# Patient Record
Sex: FEMALE | Race: WHITE
[De-identification: names, ages, dates, MRNs, and addresses within clinical notes are randomized per-mention and may not be internally consistent; named-entity substitution may affect disease eponyms.]

---

## 2018-07-09 ENCOUNTER — HOSPITAL ENCOUNTER (INPATIENT)
Dept: HOSPITAL 74 - JER | Age: 53
LOS: 10 days | Discharge: HOME | DRG: 669 | End: 2018-07-19
Attending: INTERNAL MEDICINE | Admitting: INTERNAL MEDICINE
Payer: COMMERCIAL

## 2018-07-09 VITALS — BODY MASS INDEX: 37.5 KG/M2

## 2018-07-09 DIAGNOSIS — N35.9: ICD-10-CM

## 2018-07-09 DIAGNOSIS — M31.0: ICD-10-CM

## 2018-07-09 DIAGNOSIS — N13.30: ICD-10-CM

## 2018-07-09 DIAGNOSIS — E66.9: ICD-10-CM

## 2018-07-09 DIAGNOSIS — R60.0: ICD-10-CM

## 2018-07-09 DIAGNOSIS — L03.116: ICD-10-CM

## 2018-07-09 DIAGNOSIS — N13.9: ICD-10-CM

## 2018-07-09 DIAGNOSIS — F41.9: ICD-10-CM

## 2018-07-09 DIAGNOSIS — I10: ICD-10-CM

## 2018-07-09 DIAGNOSIS — R31.0: ICD-10-CM

## 2018-07-09 DIAGNOSIS — R33.9: ICD-10-CM

## 2018-07-09 DIAGNOSIS — E11.9: ICD-10-CM

## 2018-07-09 DIAGNOSIS — N30.91: ICD-10-CM

## 2018-07-09 DIAGNOSIS — N17.9: Primary | ICD-10-CM

## 2018-07-09 DIAGNOSIS — E87.5: ICD-10-CM

## 2018-07-09 DIAGNOSIS — R21: ICD-10-CM

## 2018-07-09 DIAGNOSIS — K59.00: ICD-10-CM

## 2018-07-09 DIAGNOSIS — R00.0: ICD-10-CM

## 2018-07-09 LAB
ALBUMIN SERPL-MCNC: 3.8 G/DL (ref 3.4–5)
ALP SERPL-CCNC: 196 U/L (ref 45–117)
ALT SERPL-CCNC: 19 U/L (ref 12–78)
ANION GAP SERPL CALC-SCNC: 16 MMOL/L (ref 8–16)
AST SERPL-CCNC: 12 U/L (ref 15–37)
BILIRUB SERPL-MCNC: 0.3 MG/DL (ref 0.2–1)
BUN SERPL-MCNC: 81 MG/DL (ref 7–18)
CALCIUM SERPL-MCNC: 9.8 MG/DL (ref 8.5–10.1)
CHLORIDE SERPL-SCNC: 101 MMOL/L (ref 98–107)
CO2 SERPL-SCNC: 19 MMOL/L (ref 21–32)
CREAT SERPL-MCNC: 6.2 MG/DL (ref 0.55–1.02)
DEPRECATED RDW RBC AUTO: 12.7 % (ref 11.6–15.6)
GLUCOSE SERPL-MCNC: 269 MG/DL (ref 74–106)
HCT VFR BLD CALC: 37.3 % (ref 32.4–45.2)
HGB BLD-MCNC: 12.5 GM/DL (ref 10.7–15.3)
INR BLD: 1.19 (ref 0.82–1.09)
MCH RBC QN AUTO: 28.9 PG (ref 25.7–33.7)
MCHC RBC AUTO-ENTMCNC: 33.4 G/DL (ref 32–36)
MCV RBC: 86.3 FL (ref 80–96)
PLATELET # BLD AUTO: 386 K/MM3 (ref 134–434)
PMV BLD: 7.9 FL (ref 7.5–11.1)
POTASSIUM SERPLBLD-SCNC: 5.6 MMOL/L (ref 3.5–5.1)
PROT SERPL-MCNC: 8.7 G/DL (ref 6.4–8.2)
PT PNL PPP: 13.5 SEC (ref 9.7–13)
RBC # BLD AUTO: 4.32 M/MM3 (ref 3.6–5.2)
SODIUM SERPL-SCNC: 136 MMOL/L (ref 136–145)
WBC # BLD AUTO: 10.2 K/MM3 (ref 4–10)

## 2018-07-09 NOTE — PDOC
*Physical Exam





- Vital Signs


 Last Vital Signs











Temp Pulse Resp BP Pulse Ox


 


 98.8 F   128 H  16   161/107   98 


 


 07/09/18 15:16  07/09/18 15:16  07/09/18 15:16  07/09/18 15:16  07/09/18 15:16














- Physical Exam


Extremity: positive: Swelling (left lower swelling), Erythema (left medial thigh

)





ED Treatment Course





- LABORATORY


CBC & Chemistry Diagram: 


 07/09/18 16:58





 07/09/18 16:58





- ADDITIONAL ORDERS


Additional order review: 


 Laboratory  Results











  07/09/18 07/09/18 07/09/18





  16:58 16:58 16:58


 


PT with INR    13.50 H


 


INR    1.19 H


 


Sodium   136 


 


Potassium   5.6 H 


 


Chloride   101 


 


Carbon Dioxide   19 L 


 


Anion Gap   16 


 


BUN   81 H 


 


Creatinine   6.2 H 


 


Creat Clearance w eGFR   7.01 


 


Random Glucose   269 H 


 


Calcium   9.8 


 


Total Bilirubin   0.3 


 


AST   12 L 


 


ALT   19 


 


Alkaline Phosphatase   196 H 


 


B-Natriuretic Peptide  786.38 H  


 


Total Protein   8.7 H 


 


Albumin   3.8 








 











  07/09/18





  16:58


 


RBC  4.32


 


MCV  86.3


 


MCHC  33.4


 


RDW  12.7


 


MPV  7.9














- Medications


Given in the ED: 


ED Medications














Discontinued Medications














Generic Name Dose Route Start Last Admin





  Trade Name Freq  PRN Reason Stop Dose Admin


 


Sodium Chloride  500 mls @ 500 mls/hr  07/09/18 17:15  07/09/18 17:41





  Normal Saline -  IV  07/09/18 18:14  500 mls/hr





  ASDIR STA   Administration





     





     





     





     


 


Sodium Chloride  500 mls @ 500 mls/hr  07/09/18 17:49  07/09/18 17:58





  Normal Saline -  IV  07/09/18 18:48  500 mls/hr





  ASDIR STA   Administration





     





     





     





     


 


Ondansetron HCl  4 mg  07/09/18 17:32  07/09/18 18:02





  Zofran -  PO  07/09/18 17:33  Not Given





  ONCE ONE   





     





     





     





     


 


Ondansetron HCl  4 mg  07/09/18 17:41  07/09/18 17:58





  Zofran Injection  IVPUSH  07/09/18 17:42  4 mg





  ONCE ONE   Administration





     





     





     





     














Progress Note





- Progress Note


Progress Note: 





Received signout from PATRIZIA Brooke. Patient presents from urgent care with left 

lower extremity erythema and swelling. This concern was for DVT. EKG reveals a 

sinus tachycardia with rate of 109. Normal intervals present T waves are not 

peaked. Laboratory testing is remarkable for potassium of 5.6, BUN 8-81, 

creatinine 6.2. Patient is presently ultrasound. I'll evaluate patient once she 

has returned from ultrasound and will be admitted with renal consult.





Medical Decision Making





- Medical Decision Making





07/09/18 20:01


A/P:


53-year-old woman without significant past medical history with new onset left 

lower extremity rash and swelling





Labs notable for renal failure with elevated K- 5.6.


Renal paged for consult. 





Duplex Doppler as read by Dr. Gonsales:


There is diffuse swelling of the left leg which somewhat limits the exam on a 

technical basis.


A left common femoral vein as well as the upper and middle thirds of the 

femoral vein appear patent. The distal third of the femoral vein cannot be 

adequately compressed on this exam to the leg swelling as well as body habitus. 

Posterior tibial veins appear patent.


There is no obvious popliteal cyst.


Nonspecific enlarged left inguinal lymph node is seen with a 1.7 cm short axis 

diameter.


07/09/18 20:30





CT as read by Dr. Gonsales:


Urinary retention is noted with a current bladder volume of approximately 1400 

mL. There is resultant moderate bilateral hydronephrosis.


No CT evidence of urolithiasis.


Nonspecific mildly prominent left inguinal lymph node is seen.


Subcutaneous edema is visualized on the partially imaged upper left thigh.


Diffuse hepatic steatosis


07/09/18 21:02


Case discussed with Dr. Madrid who accepts patient for admission. Dr. Fonseca 

of nephrology is aware and requests Lala placement. Attempts to insert Lala 

catheter 2 have been unsuccessful. Unable to pass Lala catheter be on the 

first 4 mL of urethra. I sent a page to Dr. Cabrera of urology for urgent consult 

and bladder decompression.





07/09/18 22:27


Page #3 for - Deborah.


07/09/18 22:33


Spoke with Deborah service who states Dr. Sandoval is covering tonight. Message 

left


07/09/18 22:38


Spoke with Dr. Sandoval who is aware of need for bladder catheterization. Dr. Sandoval is aware of urgent need and will place Lala catheter.








*DC/Admit/Observation/Transfer


Diagnosis at time of Disposition: 


 Acute renal failure (ARF), Acute urinary retention, Hyperkalemia








- Discharge Dispostion


Condition at time of disposition: Fair


Decision to Admit order: Yes





- Referrals





- Patient Instructions





- Post Discharge Activity

## 2018-07-09 NOTE — PDOC
History of Present Illness





- General


Chief Complaint: Pain


Stated Complaint: SENT BY PCP:R/O DVT


Time Seen by Provider: 07/09/18 15:15


History Source: Patient





- History of Present Illness


Occurred: reports: other


Severity: Yes: severe


Lower Extremity Pain Location: left: other (LLE)





Past History





- Past Medical History


Allergies/Adverse Reactions: 


 Allergies











Allergy/AdvReac Type Severity Reaction Status Date / Time


 


No Known Allergies Allergy   Verified 07/09/18 15:16











COPD: No


Other medical history: DENIES.





- Suicide/Smoking/Psychosocial Hx


Smoking History: Never smoked





**Review of Systems





- Review of Systems


Constitutional: No: Chills, Fever, Unexplained wgt Loss


Respiratory: No: Shortness of Breath


Cardiac (ROS): No: Chest Pain, Lightheadedness, Palpitations


ABD/GI: Yes: Constipated, Nausea, Poor Appetite, Abdominal cramping.  No: Blood 

Streaked Bowels, Diarrhea, Rectal Bleeding, Vomiting, Tarry Stools


: No: Dysuria





*Physical Exam





- Vital Signs


 Last Vital Signs











Temp Pulse Resp BP Pulse Ox


 


 98.8 F   128 H  16   161/107   98 


 


 07/09/18 15:16  07/09/18 15:16  07/09/18 15:16  07/09/18 15:16  07/09/18 15:16














- Physical Exam


General Appearance: Yes: Appropriately Dressed.  No: Apparent Distress


HEENT: positive: Normal Voice


Neck: positive: Supple


Respiratory/Chest: positive: Lungs Clear, Normal Breath Sounds.  negative: 

Respiratory Distress


Cardiovascular: positive: Tachycardia.  negative: S1, S2


Gastrointestinal/Abdominal: positive: Soft


Musculoskeletal: negative: CVA Tenderness


Extremity: positive: Other (tight edema to LLE w/ multiple scattered 

erythematous papules of varying sizes throughout L leg, no sole involvement)





**Heart Score/ECG Review





- ECG Intrepretation


Comment:: 





07/09/18 18:25


Sinus tach to 109, no acute ST-T wave changes, normal intervals and axis





ED Treatment Course





- LABORATORY


CBC & Chemistry Diagram: 


 07/09/18 16:58





 07/09/18 16:58





Medical Decision Making





- Medical Decision Making





07/09/18 17:19


53-year-old female, denies any significant past medical history, post menopausal

, here with left leg pain and swelling.  Patient reports that she was in her 

usual state of health up until several weeks ago when she initially developed 

constipation.  States she usually goes 3-4 times a week, but found herself 

going less frequently with no obvious inciting factor.  Has been using over-the-

counter meds with some improvement but states continues to have intermittent 

constipation.  Does have some vague lower abdominal cramping and reports 

anorexia and nausea, no vomiting, f/c. Denies BRBPR, melena or recent 

unexplained weight loss. No colonoscopy in the past.  Several days ago noticed 

pruritic rash to left lower extremity and since then has had diffuse swelling 

of her L foot extending into L thigh/groin. Denies any chest pain, shortness of 

breath or palpitations and no obvious risk factors for DVT/PE.  Seen in urgent 

care today and told to come to ER.





See exam








R/o DVT vs lymphedema 2/2 ? malignancy (given new onset abd pain/constipation/

anorexia/nausea)


Tachy to 128 at triage but denies cp/sob/palpitations w/ minimal ttp to lower 

abd and tight edema of LLE diffusely


-ekg


-cxr


-LLE US, ?CTA


-CT a/p


-labs


-admission anticipated








07/09/18 18:25





07/09/18 18:57


EKG w/ sinus tach to 109. Cr of 6.2 w/ K of 5.7. Will give kayexalate and 

calcium glu. CT with po contrast pending. Pt signed out to MARIAJOSE Hawley at this time





*DC/Admit/Observation/Transfer


Diagnosis at time of Disposition: 


 Swelling of calf








- Referrals





- Patient Instructions





- Post Discharge Activity

## 2018-07-09 NOTE — PDOC
Rapid Medical Evaluation


Time Seen by Provider: 07/09/18 15:15


Medical Evaluation: 





07/09/18 15:15


I have performed a brief in-person evaluation of this patient. 


The patient presents with a chief complaint of: entire left leg swelling and 

calf swelling X 3 days, no recent travel or trauma. sent from Urgent Care


Pertinent physical exam findings:++ swelling noted in L thigh and lower leg, 2x 

the size the right, + calf tenderness, + papule rash noted +2+ non pitting edema

, disal pulse present, + tachycardia--denies CP/palpitation


I have ordered the following: labs  and duplex sono, ekg


The patient will proceed to the ED for further evaluation.


07/09/18 15:20








**Discharge Disposition





- Diagnosis


 Swelling of calf








- Referrals





- Patient Instructions





- Post Discharge Activity

## 2018-07-09 NOTE — HP
Admitting History and Physical





- Primary Care Physician


PCP: Kin Madrid





- Admission


History of Present Illness: 





53-year-old female, denies any significant past medical history, post menopausal

, here with left leg pain and swelling.  Patient reports that she was in her 

usual state of health up until several weeks ago when she initially developed 

constipation.  States she usually goes 3-4 times a week, but found herself 

going less frequently with no obvious inciting factor.  Has been using over-the-

counter meds with some improvement but states continues to have intermittent 

constipation.  Does have some vague lower abdominal cramping and reports 

anorexia and nausea, no vomiting, f/c. Denies BRBPR, melena or recent 

unexplained weight loss. No colonoscopy in the past.  Several days ago noticed 

pruritic rash to left lower extremity and since then has had diffuse swelling 

of her L foot extending into L thigh/groin. Denies any chest pain, shortness of 

breath or palpitations and no obvious risk factors for DVT/PE.  Seen in urgent 

care today and told to come to ER.











- Smoking History


Smoking history: Never smoked





Home Medications





- Allergies


Allergies/Adverse Reactions: 


 Allergies











Allergy/AdvReac Type Severity Reaction Status Date / Time


 


No Known Allergies Allergy   Verified 07/09/18 15:16














- Home Medications


Home Medications: 


Ambulatory Orders





NK [No Known Home Medication]  07/09/18 











Physical Examination


Vital Signs: 


 Vital Signs











Temperature  98 F   07/09/18 21:14


 


Pulse Rate  125 H  07/09/18 21:14


 


Respiratory Rate  17   07/09/18 21:14


 


Blood Pressure  210/127   07/09/18 21:14


 


O2 Sat by Pulse Oximetry (%)  98   07/09/18 21:14











Constitutional: Yes: No Distress


HENT: Yes: Atraumatic


Neck: Yes: Supple


Cardiovascular: Yes: Regular Rate and Rhythm


Respiratory: Yes: CTA Bilaterally


Gastrointestinal: Yes: Normal Bowel Sounds


Extremities: Yes: WNL


Edema: LLE: 2+


Peripheral Pulses WNL: Yes


Neurological: Yes: Alert, Oriented


Labs: 


 CBC, BMP





 07/09/18 16:58 





 07/09/18 16:58 











Imaging





- Results


Ultrasound: Report Reviewed





Problem List





- Problems


(1) Acute renal failure (ARF)


Assessment/Plan: 


urine out put not good


cant put folry in ..awaiting urology 


monitor I/O


renal consult


Code(s): N17.9 - ACUTE KIDNEY FAILURE, UNSPECIFIED   





(2) Acute urinary retention


Assessment/Plan: 


need martin in place





Code(s): R33.8 - OTHER RETENTION OF URINE   





(3) HTN (hypertension)


Assessment/Plan: 


on meds 


monitor





Code(s): I10 - ESSENTIAL (PRIMARY) HYPERTENSION   


Qualifiers: 


   Hypertension type: unspecified   Qualified Code(s): I10 - Essential (primary

) hypertension   





(4) Hyperkalemia


Assessment/Plan: 


will give kayexalate


monitor levels


Code(s): E87.5 - HYPERKALEMIA   





(5) Pedal edema


Code(s): R60.0 - LOCALIZED EDEMA   





Assessment/Plan





 Laboratory Tests











  07/09/18 07/09/18 07/09/18





  16:58 16:58 16:58


 


WBC  10.2 H  


 


RBC  4.32  


 


Hgb  12.5  


 


Hct  37.3  


 


MCV  86.3  


 


MCH  28.9  


 


MCHC  33.4  


 


RDW  12.7  


 


Plt Count  386  


 


MPV  7.9  


 


PT with INR   13.50 H 


 


INR   1.19 H 


 


Sodium    136


 


Potassium    5.6 H


 


Chloride    101


 


Carbon Dioxide    19 L


 


Anion Gap    16


 


BUN    81 H


 


Creatinine    6.2 H


 


Creat Clearance w eGFR    7.01


 


Random Glucose    269 H


 


Calcium    9.8


 


Total Bilirubin    0.3


 


AST    12 L


 


ALT    19


 


Alkaline Phosphatase    196 H


 


B-Natriuretic Peptide   


 


Total Protein    8.7 H


 


Albumin    3.8














  07/09/18





  16:58


 


WBC 


 


RBC 


 


Hgb 


 


Hct 


 


MCV 


 


MCH 


 


MCHC 


 


RDW 


 


Plt Count 


 


MPV 


 


PT with INR 


 


INR 


 


Sodium 


 


Potassium 


 


Chloride 


 


Carbon Dioxide 


 


Anion Gap 


 


BUN 


 


Creatinine 


 


Creat Clearance w eGFR 


 


Random Glucose 


 


Calcium 


 


Total Bilirubin 


 


AST 


 


ALT 


 


Alkaline Phosphatase 


 


B-Natriuretic Peptide  786.38 H


 


Total Protein 


 


Albumin 








Active Medications











Generic Name Dose Route Start Last Admin





  Trade Name Freq  PRN Reason Stop Dose Admin


 


Albuterol Sulfate  1 amp  07/10/18 11:55  07/10/18 13:50





  Ventolin 0.083% Nebulizer Soln -  NEB   1 amp





  Q1H PRN   Administration





  SHORT OF BREATH/WHEEZING   





     





     





     


 


Heparin Sodium (Porcine)  5,000 unit  07/10/18 10:00  07/10/18 09:47





  Heparin -  SQ   5,000 unit





  BID WILBUR   Administration





     





     





     





     


 


Sodium Chloride  1,000 mls @ 75 mls/hr  07/10/18 15:15  





  Normal Saline -  IV   





  ASDIR WILBUR   





     





     





     





     


 


Metoprolol Tartrate  25 mg  07/10/18 10:30  





  Lopressor -  PO   





  BID WILBUR

## 2018-07-10 LAB
ALBUMIN SERPL-MCNC: 3.4 G/DL (ref 3.4–5)
ALP SERPL-CCNC: 181 U/L (ref 45–117)
ALT SERPL-CCNC: 17 U/L (ref 12–78)
ANION GAP SERPL CALC-SCNC: 12 MMOL/L (ref 8–16)
ANION GAP SERPL CALC-SCNC: 12 MMOL/L (ref 8–16)
AST SERPL-CCNC: 10 U/L (ref 15–37)
BASOPHILS # BLD: 0.8 % (ref 0–2)
BILIRUB SERPL-MCNC: 0.3 MG/DL (ref 0.2–1)
BUN SERPL-MCNC: 77 MG/DL (ref 7–18)
BUN SERPL-MCNC: 81 MG/DL (ref 7–18)
CALCIUM SERPL-MCNC: 8.7 MG/DL (ref 8.5–10.1)
CALCIUM SERPL-MCNC: 9.3 MG/DL (ref 8.5–10.1)
CHLORIDE SERPL-SCNC: 106 MMOL/L (ref 98–107)
CHLORIDE SERPL-SCNC: 106 MMOL/L (ref 98–107)
CO2 SERPL-SCNC: 19 MMOL/L (ref 21–32)
CO2 SERPL-SCNC: 19 MMOL/L (ref 21–32)
CREAT SERPL-MCNC: 5.8 MG/DL (ref 0.55–1.02)
CREAT SERPL-MCNC: 6.5 MG/DL (ref 0.55–1.02)
DEPRECATED RDW RBC AUTO: 12.4 % (ref 11.6–15.6)
EOSINOPHIL # BLD: 1.8 % (ref 0–4.5)
GLUCOSE SERPL-MCNC: 258 MG/DL (ref 74–106)
GLUCOSE SERPL-MCNC: 410 MG/DL (ref 74–106)
HCT VFR BLD CALC: 34 % (ref 32.4–45.2)
HGB BLD-MCNC: 11.6 GM/DL (ref 10.7–15.3)
LYMPHOCYTES # BLD: 17.8 % (ref 8–40)
MCH RBC QN AUTO: 29.4 PG (ref 25.7–33.7)
MCHC RBC AUTO-ENTMCNC: 34.2 G/DL (ref 32–36)
MCV RBC: 86.1 FL (ref 80–96)
MONOCYTES # BLD AUTO: 9.5 % (ref 3.8–10.2)
NEUTROPHILS # BLD: 70.1 % (ref 42.8–82.8)
PLATELET # BLD AUTO: 322 K/MM3 (ref 134–434)
PMV BLD: 7.8 FL (ref 7.5–11.1)
POTASSIUM SERPLBLD-SCNC: 6 MMOL/L (ref 3.5–5.1)
POTASSIUM SERPLBLD-SCNC: 6.2 MMOL/L (ref 3.5–5.1)
PROT SERPL-MCNC: 7.7 G/DL (ref 6.4–8.2)
RBC # BLD AUTO: 3.95 M/MM3 (ref 3.6–5.2)
SODIUM SERPL-SCNC: 137 MMOL/L (ref 136–145)
SODIUM SERPL-SCNC: 137 MMOL/L (ref 136–145)
WBC # BLD AUTO: 9.4 K/MM3 (ref 4–10)

## 2018-07-10 RX ADMIN — ALBUTEROL SULFATE PRN AMP: 2.5 SOLUTION RESPIRATORY (INHALATION) at 23:18

## 2018-07-10 RX ADMIN — HEPARIN SODIUM SCH UNIT: 5000 INJECTION, SOLUTION INTRAVENOUS; SUBCUTANEOUS at 09:47

## 2018-07-10 RX ADMIN — ALBUTEROL SULFATE PRN AMP: 2.5 SOLUTION RESPIRATORY (INHALATION) at 13:33

## 2018-07-10 RX ADMIN — HEPARIN SODIUM SCH UNIT: 5000 INJECTION, SOLUTION INTRAVENOUS; SUBCUTANEOUS at 23:08

## 2018-07-10 RX ADMIN — METOPROLOL TARTRATE SCH MG: 25 TABLET, FILM COATED ORAL at 23:08

## 2018-07-10 RX ADMIN — METOPROLOL TARTRATE SCH MG: 25 TABLET, FILM COATED ORAL at 17:14

## 2018-07-10 RX ADMIN — ALBUTEROL SULFATE PRN AMP: 2.5 SOLUTION RESPIRATORY (INHALATION) at 13:50

## 2018-07-10 RX ADMIN — ALBUTEROL SULFATE PRN AMP: 2.5 SOLUTION RESPIRATORY (INHALATION) at 23:17

## 2018-07-10 RX ADMIN — ACETAMINOPHEN PRN MG: 325 TABLET ORAL at 23:10

## 2018-07-10 RX ADMIN — ALBUTEROL SULFATE PRN AMP: 2.5 SOLUTION RESPIRATORY (INHALATION) at 23:12

## 2018-07-10 NOTE — CON.CARD
Consult


Consult Specialty:: Cardiology


Referred by:: Dr. Madrid


Reason for Consultation:: Cardiac evaluation





- History of Present Illness


Chief Complaint: Difficulty with urination and abdominal fulness, left pedal 

edema


History of Present Illness: 








Patient is a 53 year old female with no significant PMH who presents with left 

leg swelling for about 4-5 days and feeling ill with abdominal fullness and 

constipation. She found herself going less to the bathroom and had some lower 

abdominal cramping. She denies fever but complained of chills. Denies chest pain

, shortness of breath or palpitations. She denies paroxysmal nocturnal dyspnea 

or orthopnea. She denies vomiting or diarrhea. She denies headache or 

lightheadedness. She has edema extending up to her left thigh. She also has JANEL 

with elevation of creatinine and hyperkalemia. Cardiology consultation was 

called due to management of hypertension. She has not had history of 

hypertension.





- History Source


History Provided By: Patient, Medical Record


Limitations to Obtaining History: No Limitations





- Alcohol/Substance Use


Hx Alcohol Use: No


History of Substance Use: reports: None





- Smoking History


Smoking history: Never smoked





Home Medications





- Allergies


Allergies/Adverse Reactions: 


 Allergies











Allergy/AdvReac Type Severity Reaction Status Date / Time


 


No Known Allergies Allergy   Verified 07/09/18 15:16














- Home Medications


Home Medications: 


Ambulatory Orders





NK [No Known Home Medication]  07/09/18 











Family Disease History





- Family Disease History


Family Disease History: CA: Mother (breast CA)





Review of Systems





- Review of Systems


Constitutional: reports: Chills.  denies: Fever


Cardiovascular: denies: Chest Pain, Palpitations, Shortness of Breath


Respiratory: denies: Cough, Hemoptysis, Orthopnea, PND, SOB, SOB on Exertion


Gastrointestinal: reports: Bloating, Constipation.  denies: Abdominal Pain, 

Diarrhea, Melena, Nausea, Rectal Bleeding, Vomiting


Genitourinary: denies: Hematuria


Musculoskeletal: denies: Joint Pain


Neurological: denies: Dizziness, Headache, Seizure, Syncope, Weakness


Vital Signs: 


 Vital Signs











Temperature  99.4 F   07/10/18 06:22


 


Pulse Rate  106 H  07/10/18 06:22


 


Respiratory Rate  18   07/10/18 06:22


 


Blood Pressure  166/87   07/10/18 06:22


 


O2 Sat by Pulse Oximetry (%)  98   07/10/18 06:22











Eyes: Yes: PERRL


HENT: Yes: Atraumatic


Neck: Yes: Supple


Respiratory: Yes: CTA Bilaterally


Gastrointestinal: Yes: Normal Bowel Sounds, Soft.  No: Tenderness


Cardiovascular: Yes: Regular Rate and Rhythm


JVD: No


Carotid Bruit: No


PMI: Non-Displaced


Heart Sounds: Yes: S1, S2.  No: Gallop


Murmur: No: Systolic Murmur, Diastolic Murmur


Edema: Yes


Edema: LLE: 2+





- Other Data


Labs, Other Data: 


 CBC, BMP





 07/10/18 07:15 





 07/10/18 07:15 





 INR, PTT











INR  1.19  (0.82-1.09)  H  07/09/18  16:58    








 Troponin, BNP











  07/09/18





  16:58


 


B-Natriuretic Peptide  786.38 H








 








Sinus tachycardia, no ST-T abnormality





Problem List





- Problems


(1) Pedal edema


Code(s): R60.0 - LOCALIZED EDEMA   





(2) Acute renal failure (ARF)


Code(s): N17.9 - ACUTE KIDNEY FAILURE, UNSPECIFIED   





(3) Acute urinary retention


Code(s): R33.8 - OTHER RETENTION OF URINE   





(4) Hyperkalemia


Code(s): E87.5 - HYPERKALEMIA   





(5) HTN (hypertension)


Code(s): I10 - ESSENTIAL (PRIMARY) HYPERTENSION   


Qualifiers: 


   Hypertension type: unspecified   Qualified Code(s): I10 - Essential (primary

) hypertension   





Assessment/Plan





1. Sinus tachycardia and hypertension


2. JANEL possible obstructive with urinary retention and hydronephrosis


3. Hyperkalemia


4. Left pedal edema, etiology unclear as ultrasound was inconclusive for ruling 

out DVT. ?lymphedema 








PLAN:


1.  input needed and for martin catheter insertion and further evaluation to 

follow regarding this urinary retention and hydronephrosis


2. BP needs to be followed. May consider beta blocker therapy for now. 


3. Monitor electrolytes including K level and renal function


4. Consider further evaluation for pedal edema. Consider venogram. 


5. Renal input to follow


6. Transthoracic echocardiography to assess LV/RV and valvular function





Further plans are to follow


Twan Alatorre MD

## 2018-07-10 NOTE — ECHO
_____________________________________________________________________________________________________
__________



Name: LUIS GANN                               Exam:Adult Echocardiogram

MRN: [id number]             Study Date: 07/10/2018 10:11 AM

_____________________________________________________________________________________________________
__________



Reason For Study: CHF

Height: 60 in        Weight: 200 lb        BSA: 1.9 m2



_____________________________________________________________________________________________________
__________



MMode/2D Measurements & Calculations

IVSd: 0.77 cm               Ao root diam: 2.7 cm                      EDV(Teich): 107.4 ml

LVIDd: 4.8 cm               LA dimension: 3.4 cm

LVPWd: 0.87 cm              ACS: 1.5 cm



                            Ao root area: 5.7 cm2



Doppler Measurements & Calculations

MV A max lemuel: 128.3 cm/sec             Ao V2 max: 154.2 cm/sec            MR max lemuel: 545.1 cm/sec

                                       Ao max P.5 mmHg                MR max P.9 mmHg

                                       Ao V2 mean: 99.1 cm/sec

                                       Ao mean P.6 mmHg

                                       Ao V2 VTI: 23.3 cm

      _______________________________________________________________________________________________
_____



TR max lemuel: 221.0 cm/sec               PA V2 max: 156.1 cm/sec

TR max P.5 mmHg                   PA max P.7 mmHg

                                       PA V2 mean: 94.2 cm/sec

                                       PA mean P.2 mmHg

                                       PA V2 VTI: 25.2 cm



_____________________________________________________________________________________________________
__________



_____________________________________________________________________________________________________
__________







Procedure

A complete two-dimensional transthoracic echocardiogram was performed (2D, M-mode, Doppler and color 
flow

Doppler).

Left Ventricle

The left ventricular size, thickness and function are normal. Ejection Fraction = 65%.

Right Ventricle

The right ventricle is normal in size and function.

Atria

The left atrium is mildly dilated. Right atrial size is normal.

Mitral Valve

The mitral valve is grossly normal. There is trace mitral regurgitation.

Tricuspid Valve

The tricuspid valve is not well visualized, but is grossly normal. There is trace tricuspid regurgita
tion.

There was insufficient TR detected to calculate RV systolic pressure.

Aortic Valve

The aortic valve is trileaflet. There is mild aortic valve thickening. There is mild aortic sclerosis
.;. No

hemodynamically significant valvular aortic stenosis.

Pulmonic Valve

The pulmonic valve is not well visualized.

Great Vessels

The aortic root is normal size.

Pericardium/Pleura

There is no pericardial effusion.

_____________________________________________________________________________________________________
__________





Interpretation Summary

The left ventricular size, thickness and function are normal

The left atrium is mildly dilated.

There is trace mitral regurgitation.

There is trace tricuspid regurgitation.

There was insufficient TR detected to calculate RV systolic pressure.

No hemodynamically significant valvular aortic stenosis.

There is no pericardial effusion.





MD Alexis Sheehan 07/10/2018 04:21 PM

## 2018-07-10 NOTE — EKG
Test Reason : 

Blood Pressure : ***/*** mmHG

Vent. Rate : 109 BPM     Atrial Rate : 109 BPM

   P-R Int : 128 ms          QRS Dur : 072 ms

    QT Int : 308 ms       P-R-T Axes : 053 034 034 degrees

   QTc Int : 414 ms

 

SINUS TACHYCARDIA

CANNOT RULE OUT ANTERIOR INFARCT , AGE UNDETERMINED

ABNORMAL ECG

 

Confirmed by MD NOREEN, JAQUELINE ([id number]) on 7/10/2018 12:03:15 PM

 

Referred By:             Confirmed By:JAQUELINE SORTO MD

## 2018-07-10 NOTE — CONSULT
Consult


Consult Specialty:: Nephrology


Reason for Consultation:: JANEL and hyperkalemia





- History of Present Illness


Chief Complaint: left leg edema


History of Present Illness: 





Pt is a 53 year old female who presents to the ER with left leg edema. She 

denies any medical history and does not have a pmd. She went to urgent care who 

sent her to the ER. She says that the edema began a few days ago. She complains 

of constipation that has been chronic. She was found to be in acute renal 

failure in the ER and I was called to evaluate her. She was also found to be in 

urinary retention. ER could not place martin as there was resistance. I called 

urology again today and they came and placed a martin. She denies shortness of 

breath. She is also developing a rash on her left leg. She denies tick bite. 





- History Source


History Provided By: Patient, Medical Record





- Alcohol/Substance Use


Hx Alcohol Use: No


History of Substance Use: reports: None





- Smoking History


Smoking history: Never smoked





Home Medications





- Allergies


Allergies/Adverse Reactions: 


 Allergies











Allergy/AdvReac Type Severity Reaction Status Date / Time


 


No Known Allergies Allergy   Verified 07/09/18 15:16














- Home Medications


Home Medications: 


Ambulatory Orders





NK [No Known Home Medication]  07/09/18 











Family Disease History





- Family Disease History


Family Disease History: CA: Mother (breast CA)





Review of Systems





- Review of Systems


Constitutional: reports: No Symptoms


Eyes: reports: No Symptoms


HENT: reports: No Symptoms


Neck: reports: No Symptoms


Cardiovascular: reports: No Symptoms


Respiratory: reports: No Symptoms


Gastrointestinal: reports: Constipation


Genitourinary: reports: Dysuria


Integumentary: reports: Lesions, Rash


Neurological: reports: No Symptoms


Endocrine: reports: No Symptoms


Hematology/Lymphatic: reports: No Symptoms


Psychiatric: reports: No Symptoms





Physical Exam


Vital Signs: 


 Vital Signs











Temperature  99.4 F   07/10/18 06:22


 


Pulse Rate  106 H  07/10/18 06:22


 


Respiratory Rate  18   07/10/18 06:22


 


Blood Pressure  166/87   07/10/18 06:22


 


O2 Sat by Pulse Oximetry (%)  98   07/10/18 06:22











Constitutional: Yes: Calm


Eyes: Yes: Conjunctiva Clear


HENT: Yes: Atraumatic


Neck: Yes: Supple


Cardiovascular: Yes: S1, S2


Respiratory: Yes: CTA Bilaterally


Gastrointestinal: Yes: Soft, Abdomen, Obese


Breast(s): Yes: Other (distended bladder)


Musculoskeletal: Yes: WNL


Edema: Yes


Edema: LLE: 1+


Neurological: Yes: Oriented


Psychiatric: Yes: Oriented


Labs: 


 CBC, BMP





 07/10/18 07:15 





 07/10/18 07:15 





 Laboratory Tests











  07/09/18 07/09/18 07/09/18





  16:58 16:58 16:58


 


WBC  10.2 H  


 


Hgb  12.5  


 


PT with INR   13.50 H 


 


INR   1.19 H 


 


Sodium    136


 


Potassium    5.6 H


 


Chloride   


 


Carbon Dioxide   


 


Anion Gap   


 


BUN    81 H


 


Creatinine    6.2 H


 


Random Glucose    269 H














  07/10/18 07/10/18





  07:15 07:15


 


WBC  9.4 


 


Hgb  11.6 


 


PT with INR  


 


INR  


 


Sodium   137


 


Potassium   6.2 H*


 


Chloride   106


 


Carbon Dioxide   19 L


 


Anion Gap   12


 


BUN   81 H


 


Creatinine   6.5 H


 


Random Glucose   258 H














Imaging





- Results


Cat Scan: Report Reviewed





Problem List





- Problems


(1) Acute renal failure (ARF)


Code(s): N17.9 - ACUTE KIDNEY FAILURE, UNSPECIFIED   





(2) Acute urinary retention


Code(s): R33.8 - OTHER RETENTION OF URINE   





(3) HTN (hypertension)


Code(s): I10 - ESSENTIAL (PRIMARY) HYPERTENSION   


Qualifiers: 


   Hypertension type: unspecified   Qualified Code(s): I10 - Essential (primary

) hypertension   





(4) Hyperkalemia


Code(s): E87.5 - HYPERKALEMIA   





Assessment/Plan





 Current Medications











Generic Name Dose Route Start Last Admin





  Trade Name Freq  PRN Reason Stop Dose Admin


 


Albuterol Sulfate  1 amp  07/10/18 11:55  07/10/18 13:50





  Ventolin 0.083% Nebulizer Soln -  NEB   1 amp





  Q1H PRN   Administration





  SHORT OF BREATH/WHEEZING   





     





     





     


 


Heparin Sodium (Porcine)  5,000 unit  07/10/18 10:00  07/10/18 09:47





  Heparin -  SQ   5,000 unit





  BID [facility]   Administration





     





     





     





     


 


Metoprolol Tartrate  25 mg  07/10/18 10:30  





  Lopressor -  PO   





  BID [facility]   





     





     





     





     








Impression


1. JANEL


2. hyperkalemia


3. urinary retention


4. constipation


5. left leg edema


6. rash





Plan


- urology placing martin now


- monitor urine output


- start fluids and monitor for post obstructive diuresis


- potassium treated medically


- pt should be on tele


- repeat bmp


- discussed with medical team


- unclear why she has unilater edema


- will need bowel regimen for constipation


- age appropriate screening


- will follow


- will send renal workup including anca and lupe


- consider derm eval


Dr Fonseca

## 2018-07-10 NOTE — PN
Progress Note, Physician





- Current Medication List


Current Medications: 


Active Medications





Albuterol Sulfate (Ventolin 0.083% Nebulizer Soln -)  1 amp NEB Q1H PRN


   PRN Reason: SHORT OF BREATH/WHEEZING


   Last Admin: 07/10/18 13:50 Dose:  1 amp


Heparin Sodium (Porcine) (Heparin -)  5,000 unit SQ BID [facility]


   Last Admin: 07/10/18 09:47 Dose:  5,000 unit


Sodium Chloride (Normal Saline -)  1,000 mls @ 75 mls/hr IV ASDIR [facility]


Metoprolol Tartrate (Lopressor -)  25 mg PO BID [facility]











- Objective


Vital Signs: 


 Vital Signs











Temperature  99.4 F   07/10/18 06:22


 


Pulse Rate  106 H  07/10/18 06:22


 


Respiratory Rate  18   07/10/18 06:22


 


Blood Pressure  166/87   07/10/18 06:22


 


O2 Sat by Pulse Oximetry (%)  98   07/10/18 06:22











Constitutional: Yes: No Distress


HENT: Yes: Atraumatic


Neck: Yes: Supple


Cardiovascular: Yes: Regular Rate and Rhythm


Respiratory: Yes: CTA Bilaterally


Gastrointestinal: Yes: Normal Bowel Sounds


Extremities: Yes: Other (llex swollen, warm, some small red eruptions)


Edema: Yes


Edema: RLE: 2+


Neurological: Yes: Alert, Oriented


Labs: 


 CBC, BMP





 07/10/18 07:15 





 07/10/18 07:15 





 INR, PTT











INR  1.19  (0.82-1.09)  H  07/09/18  16:58    














Problem List





- Problems


(1) Acute renal failure (ARF)


Assessment/Plan: 


urine out put not good


cant put martin in ..awaiting urology 


monitor I/O


renal consult


Code(s): N17.9 - ACUTE KIDNEY FAILURE, UNSPECIFIED   


Qualifiers: 


   Acute renal failure type: unspecified   Qualified Code(s): N17.9 - Acute 

kidney failure, unspecified   





(2) Acute urinary retention


Assessment/Plan: 


need martin in place





Code(s): R33.8 - OTHER RETENTION OF URINE   





(3) HTN (hypertension)


Assessment/Plan: 


on meds 


monitor





Code(s): I10 - ESSENTIAL (PRIMARY) HYPERTENSION   


Qualifiers: 


   Hypertension type: essential hypertension   Qualified Code(s): I10 - 

Essential (primary) hypertension   





(4) Hyperkalemia


Assessment/Plan: 


will give kayexalate


monitor levels


Code(s): E87.5 - HYPERKALEMIA   





(5) Pedal edema


Code(s): R60.0 - LOCALIZED EDEMA   





(6) Rash and nonspecific skin eruption


Assessment/Plan: 


derm consult


Code(s): R21 - RASH AND OTHER NONSPECIFIC SKIN ERUPTION

## 2018-07-11 LAB
ALBUMIN SERPL-MCNC: 3.2 G/DL (ref 3.4–5)
ALP SERPL-CCNC: 202 U/L (ref 45–117)
ALT SERPL-CCNC: 19 U/L (ref 12–78)
ANION GAP SERPL CALC-SCNC: 10 MMOL/L (ref 8–16)
ANION GAP SERPL CALC-SCNC: 12 MMOL/L (ref 8–16)
ANION GAP SERPL CALC-SCNC: 12 MMOL/L (ref 8–16)
ANION GAP SERPL CALC-SCNC: 13 MMOL/L (ref 8–16)
AST SERPL-CCNC: 12 U/L (ref 15–37)
BILIRUB SERPL-MCNC: 0.3 MG/DL (ref 0.2–1)
BUN SERPL-MCNC: 71 MG/DL (ref 7–18)
BUN SERPL-MCNC: 75 MG/DL (ref 7–18)
BUN SERPL-MCNC: 76 MG/DL (ref 7–18)
BUN SERPL-MCNC: 76 MG/DL (ref 7–18)
CALCIUM SERPL-MCNC: 8.4 MG/DL (ref 8.5–10.1)
CALCIUM SERPL-MCNC: 8.6 MG/DL (ref 8.5–10.1)
CALCIUM SERPL-MCNC: 8.6 MG/DL (ref 8.5–10.1)
CALCIUM SERPL-MCNC: 9.2 MG/DL (ref 8.5–10.1)
CHLORIDE SERPL-SCNC: 104 MMOL/L (ref 98–107)
CHLORIDE SERPL-SCNC: 105 MMOL/L (ref 98–107)
CHLORIDE SERPL-SCNC: 106 MMOL/L (ref 98–107)
CHLORIDE SERPL-SCNC: 107 MMOL/L (ref 98–107)
CO2 SERPL-SCNC: 19 MMOL/L (ref 21–32)
CO2 SERPL-SCNC: 19 MMOL/L (ref 21–32)
CO2 SERPL-SCNC: 20 MMOL/L (ref 21–32)
CO2 SERPL-SCNC: 20 MMOL/L (ref 21–32)
CREAT SERPL-MCNC: 5.7 MG/DL (ref 0.55–1.02)
CREAT SERPL-MCNC: 6 MG/DL (ref 0.55–1.02)
DEPRECATED RDW RBC AUTO: 12.6 % (ref 11.6–15.6)
GLUCOSE SERPL-MCNC: 169 MG/DL (ref 74–106)
GLUCOSE SERPL-MCNC: 236 MG/DL (ref 74–106)
GLUCOSE SERPL-MCNC: 239 MG/DL (ref 74–106)
GLUCOSE SERPL-MCNC: 319 MG/DL (ref 74–106)
HCT VFR BLD CALC: 28.2 % (ref 32.4–45.2)
HGB BLD-MCNC: 9.3 GM/DL (ref 10.7–15.3)
MCH RBC QN AUTO: 28.8 PG (ref 25.7–33.7)
MCHC RBC AUTO-ENTMCNC: 33 G/DL (ref 32–36)
MCV RBC: 87.3 FL (ref 80–96)
PLATELET # BLD AUTO: 332 K/MM3 (ref 134–434)
PMV BLD: 8.6 FL (ref 7.5–11.1)
POTASSIUM SERPLBLD-SCNC: 5.4 MMOL/L (ref 3.5–5.1)
POTASSIUM SERPLBLD-SCNC: 5.6 MMOL/L (ref 3.5–5.1)
POTASSIUM SERPLBLD-SCNC: 5.9 MMOL/L (ref 3.5–5.1)
POTASSIUM SERPLBLD-SCNC: 5.9 MMOL/L (ref 3.5–5.1)
PROT SERPL-MCNC: 7.4 G/DL (ref 6.4–8.2)
RBC # BLD AUTO: 3.23 M/MM3 (ref 3.6–5.2)
SODIUM SERPL-SCNC: 135 MMOL/L (ref 136–145)
SODIUM SERPL-SCNC: 136 MMOL/L (ref 136–145)
SODIUM SERPL-SCNC: 137 MMOL/L (ref 136–145)
SODIUM SERPL-SCNC: 139 MMOL/L (ref 136–145)
WBC # BLD AUTO: 11.6 K/MM3 (ref 4–10)

## 2018-07-11 RX ADMIN — SODIUM CHLORIDE SCH MLS/HR: 4.5 INJECTION, SOLUTION INTRAVENOUS at 00:30

## 2018-07-11 RX ADMIN — HEPARIN SODIUM SCH: 5000 INJECTION, SOLUTION INTRAVENOUS; SUBCUTANEOUS at 21:53

## 2018-07-11 RX ADMIN — ACETAMINOPHEN PRN MG: 325 TABLET ORAL at 10:52

## 2018-07-11 RX ADMIN — ACETAMINOPHEN PRN MG: 325 TABLET ORAL at 04:49

## 2018-07-11 RX ADMIN — METOPROLOL TARTRATE SCH MG: 25 TABLET, FILM COATED ORAL at 09:13

## 2018-07-11 RX ADMIN — METOPROLOL TARTRATE SCH MG: 25 TABLET, FILM COATED ORAL at 21:51

## 2018-07-11 RX ADMIN — INSULIN ASPART SCH UNITS: 100 INJECTION, SOLUTION INTRAVENOUS; SUBCUTANEOUS at 23:16

## 2018-07-11 RX ADMIN — HEPARIN SODIUM SCH: 5000 INJECTION, SOLUTION INTRAVENOUS; SUBCUTANEOUS at 09:13

## 2018-07-11 NOTE — PN
Progress Note, Physician


History of Present Illness: 





Pt seen and examined at bedside. She is awake and alert. She denies shortness 

of breath. 





- Current Medication List


Current Medications: 


Active Medications





Acetaminophen (Tylenol -)  650 mg PO Q6H PRN


   PRN Reason: FEVER


   Last Admin: 07/11/18 10:52 Dose:  650 mg


Albuterol Sulfate (Ventolin 0.083% Nebulizer Soln -)  1 amp NEB Q1H PRN


   PRN Reason: SHORT OF BREATH/WHEEZING


   Last Admin: 07/10/18 13:50 Dose:  1 amp


Heparin Sodium (Porcine) (Heparin -)  5,000 unit SQ BID [facility]


   Last Admin: 07/11/18 09:13 Dose:  Not Given


Sodium Chloride (1/2 Normal Saline)  1,000 mls @ 125 mls/hr IV ASDIR [facility]


   Last Admin: 07/11/18 00:30 Dose:  125 mls/hr


Metoprolol Tartrate (Lopressor -)  25 mg PO BID [facility]


   Last Admin: 07/11/18 09:13 Dose:  25 mg











- Objective


Vital Signs: 


 Vital Signs











Temperature  98.2 F   07/11/18 06:00


 


Pulse Rate  98 H  07/11/18 06:00


 


Respiratory Rate  18   07/11/18 06:00


 


Blood Pressure  143/75   07/11/18 06:00


 


O2 Sat by Pulse Oximetry (%)  98   07/11/18 03:54











Constitutional: Yes: Calm


Eyes: Yes: Conjunctiva Clear


HENT: Yes: Atraumatic


Cardiovascular: Yes: S1, S2


Respiratory: Yes: CTA Bilaterally


Gastrointestinal: Yes: Soft, Abdomen, Obese


Genitourinary: Yes: Martin Present, Hematuria


Edema: Yes


Edema: LLE: 1+, RLE: Trace


Neurological: Yes: Oriented


Psychiatric: Yes: Oriented


Labs: 


 CBC, BMP





 07/10/18 07:15 





 07/11/18 12:00 





 INR, PTT











INR  1.19  (0.82-1.09)  H  07/09/18  16:58    














Problem List





- Problems


(1) Acute renal failure (ARF)


Code(s): N17.9 - ACUTE KIDNEY FAILURE, UNSPECIFIED   


Qualifiers: 


   Acute renal failure type: unspecified   Qualified Code(s): N17.9 - Acute 

kidney failure, unspecified   





(2) Acute urinary retention


Code(s): R33.8 - OTHER RETENTION OF URINE   





(3) HTN (hypertension)


Code(s): I10 - ESSENTIAL (PRIMARY) HYPERTENSION   


Qualifiers: 


   Hypertension type: essential hypertension   Qualified Code(s): I10 - 

Essential (primary) hypertension   





(4) Hyperkalemia


Code(s): E87.5 - HYPERKALEMIA   





Assessment/Plan


 Current Medications











Generic Name Dose Route Start Last Admin





  Trade Name Freq  PRN Reason Stop Dose Admin


 


Acetaminophen  650 mg  07/10/18 17:06  07/11/18 10:52





  Tylenol -  PO   650 mg





  Q6H PRN   Administration





  FEVER   





     





     





     


 


Albuterol Sulfate  1 amp  07/10/18 11:55  07/10/18 13:50





  Ventolin 0.083% Nebulizer Soln -  NEB   1 amp





  Q1H PRN   Administration





  SHORT OF BREATH/WHEEZING   





     





     





     


 


Heparin Sodium (Porcine)  5,000 unit  07/10/18 10:00  07/11/18 09:13





  Heparin -  SQ   Not Given





  BID [facility]   





     





     





     





     


 


Sodium Chloride  1,000 mls @ 125 mls/hr  07/10/18 22:00  07/11/18 00:30





  1/2 Normal Saline  IV   125 mls/hr





  ASDIR [facility]   Administration





     





     





     





     


 


Metoprolol Tartrate  25 mg  07/10/18 10:30  07/11/18 09:13





  Lopressor -  PO   25 mg





  BID [facility]   Administration





     





     





     





     











Impression


1. JANEL


2. hyperkalemia


3. urinary retention


4. constipation


5. left leg edema


6. rash





Plan


- keep martin in place


- monitor lytes


- kayexylate for hyperkalemia which is slowly improving


- cont fluids


- give lasix if she developed overload


- erologic workup in progress


- age appropriate screening


- will follow


- derm henri pending





Dr Fonseca

## 2018-07-11 NOTE — CONSULT
Consult - text type





- Consultation


Consultation Note: 





Dermatology consult 


patient recently diagnosed with acute renal failure, HTN, leg edema on left leg

, and pustular, purpuric eruption on L calf associated with edema. 


Dx r/o lesions secondary to edema or vasculitis 


Consent to do biopsy in am


Acne rosacea on cheeks  RX as outpatient.

## 2018-07-11 NOTE — PN
Progress Note, Physician


History of Present Illness: 


Tolerating lunch, receiving IVF.








- Current Medication List


Current Medications: 


Active Medications





Acetaminophen (Tylenol -)  650 mg PO Q6H PRN


   PRN Reason: FEVER


   Last Admin: 07/11/18 10:52 Dose:  650 mg


Albuterol Sulfate (Ventolin 0.083% Nebulizer Soln -)  1 amp NEB Q1H PRN


   PRN Reason: SHORT OF BREATH/WHEEZING


   Last Admin: 07/10/18 13:50 Dose:  1 amp


Heparin Sodium (Porcine) (Heparin -)  5,000 unit SQ BID [facility]


   Last Admin: 07/11/18 09:13 Dose:  Not Given


Sodium Chloride (1/2 Normal Saline)  1,000 mls @ 125 mls/hr IV ASDIR [facility]


   Last Admin: 07/11/18 00:30 Dose:  125 mls/hr


Metoprolol Tartrate (Lopressor -)  25 mg PO BID [facility]


   Last Admin: 07/11/18 09:13 Dose:  25 mg











- Objective


Vital Signs: 


 Vital Signs











Temperature  98.2 F   07/11/18 06:00


 


Pulse Rate  98 H  07/11/18 06:00


 


Respiratory Rate  18   07/11/18 06:00


 


Blood Pressure  143/75   07/11/18 06:00


 


O2 Sat by Pulse Oximetry (%)  98   07/11/18 03:54











Constitutional: Yes: No Distress, Calm


Neck: Yes: Supple


Cardiovascular: Yes: Regular Rate and Rhythm


Respiratory: Yes: Regular, CTA Bilaterally


Gastrointestinal: Yes: Normal Bowel Sounds, Soft


Edema: Yes


Edema: LLE: 2+


Labs: 


 CBC, BMP





 07/10/18 07:15 





 07/11/18 06:15 





 INR, PTT











INR  1.19  (0.82-1.09)  H  07/09/18  16:58    














- ....Imaging


Ultrasound: Report Reviewed (Mod bilateral hydro and bladder distension improved

)





Problem List





- Problems


(1) Acute urinary retention


Code(s): R33.8 - OTHER RETENTION OF URINE   





(2) HTN (hypertension)


Code(s): I10 - ESSENTIAL (PRIMARY) HYPERTENSION   


Qualifiers: 


   Hypertension type: essential hypertension   Qualified Code(s): I10 - 

Essential (primary) hypertension   





(3) Hyperkalemia


Code(s): E87.5 - HYPERKALEMIA   





(4) Pedal edema


Code(s): R60.0 - LOCALIZED EDEMA   





(5) Acute bilateral obstructive uropathy


Code(s): N13.9 - OBSTRUCTIVE AND REFLUX UROPATHY, UNSPECIFIED   





Assessment/Plan


Transthoracic echocardiography to assess LV/RV and valvular function





1. Sinus tachycardia and hypertension improved


2. JANEL and hyperkalemia referable to obstructive uropathy with urinary 

retention and moderate bilateral hydronephrosis


3. Left pedal edema, etiology unclear as ultrasound was inconclusive for ruling 

out DVT. ?lymphedema 








PLAN:


1. Lala placed with traumatic hematuria, urology management of urinary 

retention and hydronephrosis


2. Continue Lopressor 25 bid


3. Monitor electrolytes during post-obstructive diuresis including K level and 

renal function with IVF


4. Left CT venogram once renal fxn recovers


5. Renal input appreciated


6. DVT prophylaxis

## 2018-07-11 NOTE — PN
Progress Note, Physician


History of Present Illness: 





feeling good





- Current Medication List


Current Medications: 


Active Medications





Acetaminophen (Tylenol -)  650 mg PO Q6H PRN


   PRN Reason: FEVER


   Last Admin: 07/11/18 10:52 Dose:  650 mg


Albuterol Sulfate (Ventolin 0.083% Nebulizer Soln -)  1 amp NEB Q1H PRN


   PRN Reason: SHORT OF BREATH/WHEEZING


   Last Admin: 07/10/18 13:50 Dose:  1 amp


Heparin Sodium (Porcine) (Heparin -)  5,000 unit SQ BID [facility]


   Last Admin: 07/11/18 09:13 Dose:  Not Given


Sodium Chloride (1/2 Normal Saline)  1,000 mls @ 125 mls/hr IV ASDIR [facility]


   Last Admin: 07/11/18 00:30 Dose:  125 mls/hr


Metoprolol Tartrate (Lopressor -)  25 mg PO BID [facility]


   Last Admin: 07/11/18 09:13 Dose:  25 mg











- Objective


Vital Signs: 


 Vital Signs











Temperature  98.0 F   07/11/18 14:00


 


Pulse Rate  101 H  07/11/18 14:00


 


Respiratory Rate  18   07/11/18 06:00


 


Blood Pressure  124/87   07/11/18 14:00


 


O2 Sat by Pulse Oximetry (%)  98   07/11/18 03:54











Constitutional: Yes: No Distress


HENT: Yes: Atraumatic


Neck: Yes: Supple


Cardiovascular: Yes: Regular Rate and Rhythm


Respiratory: Yes: CTA Bilaterally


Gastrointestinal: Yes: Normal Bowel Sounds


Extremities: Yes: Other (llex edema, erythema)


Edema: Yes


Edema: LLE: 2+


Neurological: Yes: Alert, Oriented


Labs: 


 CBC, BMP





 07/10/18 07:15 





 07/11/18 12:00 





 INR, PTT











INR  1.19  (0.82-1.09)  H  07/09/18  16:58    














Problem List





- Problems


(1) Acute renal failure (ARF)


Assessment/Plan: 


bloody output via martin


awaiting urology


Code(s): N17.9 - ACUTE KIDNEY FAILURE, UNSPECIFIED   


Qualifiers: 


   Acute renal failure type: unspecified   Qualified Code(s): N17.9 - Acute 

kidney failure, unspecified   





(2) Acute urinary retention


Assessment/Plan: 


need martin in place


renal on board


Code(s): R33.8 - OTHER RETENTION OF URINE   





(3) HTN (hypertension)


Assessment/Plan: 


on meds 


monitor





Code(s): I10 - ESSENTIAL (PRIMARY) HYPERTENSION   


Qualifiers: 


   Hypertension type: essential hypertension   Qualified Code(s): I10 - 

Essential (primary) hypertension   





(4) Hyperkalemia


Code(s): E87.5 - HYPERKALEMIA   





(5) Pedal edema


Code(s): R60.0 - LOCALIZED EDEMA   





(6) Acute bilateral obstructive uropathy


Code(s): N13.9 - OBSTRUCTIVE AND REFLUX UROPATHY, UNSPECIFIED   





(7) Rash and nonspecific skin eruption


Assessment/Plan: 


derm consult..reviewed


cellulitis


start abx


id consult


Code(s): R21 - RASH AND OTHER NONSPECIFIC SKIN ERUPTION

## 2018-07-12 LAB
ALBUMIN SERPL-MCNC: 2.6 G/DL (ref 3.4–5)
ALP SERPL-CCNC: 174 U/L (ref 45–117)
ALT SERPL-CCNC: 14 U/L (ref 12–78)
ANION GAP SERPL CALC-SCNC: 11 MMOL/L (ref 8–16)
AST SERPL-CCNC: 8 U/L (ref 15–37)
BASOPHILS # BLD: 0.4 % (ref 0–2)
BILIRUB SERPL-MCNC: 0.5 MG/DL (ref 0.2–1)
BUN SERPL-MCNC: 59 MG/DL (ref 7–18)
CALCIUM SERPL-MCNC: 7.9 MG/DL (ref 8.5–10.1)
CHLORIDE SERPL-SCNC: 113 MMOL/L (ref 98–107)
CO2 SERPL-SCNC: 21 MMOL/L (ref 21–32)
CREAT SERPL-MCNC: 4.3 MG/DL (ref 0.55–1.02)
DEPRECATED RDW RBC AUTO: 12.4 % (ref 11.6–15.6)
EOSINOPHIL # BLD: 1.4 % (ref 0–4.5)
GLUCOSE SERPL-MCNC: 98 MG/DL (ref 74–106)
HCT VFR BLD CALC: 24.7 % (ref 32.4–45.2)
HGB BLD-MCNC: 8.5 GM/DL (ref 10.7–15.3)
LYMPHOCYTES # BLD: 11 % (ref 8–40)
MCH RBC QN AUTO: 30.1 PG (ref 25.7–33.7)
MCHC RBC AUTO-ENTMCNC: 34.6 G/DL (ref 32–36)
MCV RBC: 87 FL (ref 80–96)
MONOCYTES # BLD AUTO: 6.9 % (ref 3.8–10.2)
NEUTROPHILS # BLD: 80.3 % (ref 42.8–82.8)
PLATELET # BLD AUTO: 283 K/MM3 (ref 134–434)
PMV BLD: 8.1 FL (ref 7.5–11.1)
POTASSIUM SERPLBLD-SCNC: 4.5 MMOL/L (ref 3.5–5.1)
PROT SERPL-MCNC: 6.3 G/DL (ref 6.4–8.2)
RBC # BLD AUTO: 2.84 M/MM3 (ref 3.6–5.2)
SODIUM SERPL-SCNC: 145 MMOL/L (ref 136–145)
WBC # BLD AUTO: 11.3 K/MM3 (ref 4–10)

## 2018-07-12 PROCEDURE — 3E1K38Z IRRIGATION OF GENITOURINARY TRACT USING IRRIGATING SUBSTANCE, PERCUTANEOUS APPROACH: ICD-10-PCS | Performed by: UROLOGY

## 2018-07-12 PROCEDURE — 0HBLXZX EXCISION OF LEFT LOWER LEG SKIN, EXTERNAL APPROACH, DIAGNOSTIC: ICD-10-PCS

## 2018-07-12 PROCEDURE — 0T9B8ZZ DRAINAGE OF BLADDER, VIA NATURAL OR ARTIFICIAL OPENING ENDOSCOPIC: ICD-10-PCS | Performed by: UROLOGY

## 2018-07-12 RX ADMIN — HEPARIN SODIUM SCH: 5000 INJECTION, SOLUTION INTRAVENOUS; SUBCUTANEOUS at 22:26

## 2018-07-12 RX ADMIN — INSULIN ASPART SCH UNITS: 100 INJECTION, SOLUTION INTRAVENOUS; SUBCUTANEOUS at 06:30

## 2018-07-12 RX ADMIN — PIPERACILLIN SODIUM AND TAZOBACTAM SODIUM SCH MLS/HR: .25; 2 INJECTION, POWDER, LYOPHILIZED, FOR SOLUTION INTRAVENOUS at 17:45

## 2018-07-12 RX ADMIN — PIPERACILLIN SODIUM AND TAZOBACTAM SODIUM SCH MLS/HR: .25; 2 INJECTION, POWDER, LYOPHILIZED, FOR SOLUTION INTRAVENOUS at 09:26

## 2018-07-12 RX ADMIN — INSULIN ASPART SCH UNITS: 100 INJECTION, SOLUTION INTRAVENOUS; SUBCUTANEOUS at 22:22

## 2018-07-12 RX ADMIN — HEPARIN SODIUM SCH: 5000 INJECTION, SOLUTION INTRAVENOUS; SUBCUTANEOUS at 09:26

## 2018-07-12 RX ADMIN — SODIUM CHLORIDE SCH MLS/HR: 4.5 INJECTION, SOLUTION INTRAVENOUS at 01:37

## 2018-07-12 RX ADMIN — ACETAMINOPHEN PRN MG: 325 TABLET ORAL at 02:55

## 2018-07-12 RX ADMIN — SODIUM CHLORIDE SCH MLS/HR: 4.5 INJECTION, SOLUTION INTRAVENOUS at 22:25

## 2018-07-12 RX ADMIN — METOPROLOL TARTRATE SCH MG: 25 TABLET, FILM COATED ORAL at 09:26

## 2018-07-12 RX ADMIN — INSULIN ASPART SCH UNITS: 100 INJECTION, SOLUTION INTRAVENOUS; SUBCUTANEOUS at 19:13

## 2018-07-12 RX ADMIN — METOPROLOL TARTRATE SCH MG: 25 TABLET, FILM COATED ORAL at 22:22

## 2018-07-12 RX ADMIN — PIPERACILLIN SODIUM AND TAZOBACTAM SODIUM SCH MLS/HR: .25; 2 INJECTION, POWDER, LYOPHILIZED, FOR SOLUTION INTRAVENOUS at 01:37

## 2018-07-12 RX ADMIN — INSULIN ASPART SCH UNITS: 100 INJECTION, SOLUTION INTRAVENOUS; SUBCUTANEOUS at 12:25

## 2018-07-12 RX ADMIN — ACETAMINOPHEN PRN MG: 325 TABLET ORAL at 17:41

## 2018-07-12 NOTE — PN
Progress Note, Physician


History of Present Illness: 





Pt seen and examined at bedside. She is awake and alert. She still has 

hematuria. 





- Current Medication List


Current Medications: 


Active Medications





Acetaminophen (Tylenol -)  650 mg PO Q6H PRN


   PRN Reason: FEVER


   Last Admin: 07/12/18 02:55 Dose:  650 mg


Albuterol Sulfate (Ventolin 0.083% Nebulizer Soln -)  1 amp NEB Q1H PRN


   PRN Reason: SHORT OF BREATH/WHEEZING


   Last Admin: 07/10/18 13:50 Dose:  1 amp


Heparin Sodium (Porcine) (Heparin -)  5,000 unit SQ BID [facility]


   Last Admin: 07/12/18 09:26 Dose:  Not Given


Sodium Chloride (1/2 Normal Saline)  1,000 mls @ 125 mls/hr IV ASDIR [facility]


   Last Admin: 07/12/18 01:37 Dose:  125 mls/hr


Piperacillin Sod/Tazobactam (Sod 2.25 gm/ Dextrose)  50 mls @ 100 mls/hr IVPB 

Q8H-IV [facility]; Protocol


   Last Admin: 07/12/18 09:26 Dose:  100 mls/hr


Insulin Aspart (Novolog Vial Sliding Scale -)  1 vial SQ ACHS [facility]; Protocol


   Last Admin: 07/12/18 12:25 Dose:  4 units


Metoprolol Tartrate (Lopressor -)  25 mg PO BID [facility]


   Last Admin: 07/12/18 09:26 Dose:  25 mg











- Objective


Vital Signs: 


 Vital Signs











Temperature  98.5 F   07/12/18 06:00


 


Pulse Rate  102 H  07/12/18 06:00


 


Respiratory Rate  20   07/12/18 06:00


 


Blood Pressure  120/72   07/12/18 06:00


 


O2 Sat by Pulse Oximetry (%)  99   07/11/18 21:00











Constitutional: Yes: Calm


Eyes: Yes: Conjunctiva Clear


HENT: Yes: Atraumatic


Neck: Yes: Supple


Cardiovascular: Yes: S1, S2


Respiratory: Yes: CTA Bilaterally


Gastrointestinal: Yes: Soft, Abdomen, Obese


Genitourinary: Yes: Martin Present, Hematuria


Musculoskeletal: Yes: WNL


Edema: LLE: Trace


Integumentary: Yes: Rash


Neurological: Yes: Oriented


Psychiatric: Yes: Oriented


Labs: 


 CBC, BMP





 07/12/18 06:20 





 07/12/18 06:20 





 INR, PTT











INR  1.19  (0.82-1.09)  H  07/09/18  16:58    














Problem List





- Problems


(1) Acute renal failure (ARF)


Code(s): N17.9 - ACUTE KIDNEY FAILURE, UNSPECIFIED   


Qualifiers: 


   Acute renal failure type: unspecified   Qualified Code(s): N17.9 - Acute 

kidney failure, unspecified   





(2) Acute urinary retention


Code(s): R33.8 - OTHER RETENTION OF URINE   





(3) HTN (hypertension)


Code(s): I10 - ESSENTIAL (PRIMARY) HYPERTENSION   


Qualifiers: 


   Hypertension type: essential hypertension   Qualified Code(s): I10 - 

Essential (primary) hypertension   





(4) Hyperkalemia


Code(s): E87.5 - HYPERKALEMIA   





Assessment/Plan


 Current Medications











Generic Name Dose Route Start Last Admin





  Trade Name Freq  PRN Reason Stop Dose Admin


 


Acetaminophen  650 mg  07/10/18 17:06  07/12/18 02:55





  Tylenol -  PO   650 mg





  Q6H PRN   Administration





  FEVER   





     





     





     


 


Albuterol Sulfate  1 amp  07/10/18 11:55  07/10/18 13:50





  Ventolin 0.083% Nebulizer Soln -  NEB   1 amp





  Q1H PRN   Administration





  SHORT OF BREATH/WHEEZING   





     





     





     


 


Heparin Sodium (Porcine)  5,000 unit  07/10/18 10:00  07/12/18 09:26





  Heparin -  SQ   Not Given





  BID [facility]   





     





     





     





     


 


Sodium Chloride  1,000 mls @ 125 mls/hr  07/10/18 22:00  07/12/18 01:37





  1/2 Normal Saline  IV   125 mls/hr





  ASDIR [facility]   Administration





     





     





     





     


 


Piperacillin Sod/Tazobactam  50 mls @ 100 mls/hr  07/12/18 02:00  07/12/18 09:26





  Sod 2.25 gm/ Dextrose  IVPB   100 mls/hr





  Q8H-IV [facility]   Administration





     





     





  Protocol   





     


 


Insulin Aspart  1 vial  07/11/18 22:30  07/12/18 12:25





  Novolog Vial Sliding Scale -  SQ   4 units





  ACHS [facility]   Administration





     





     





  Protocol   





     


 


Metoprolol Tartrate  25 mg  07/10/18 10:30  07/12/18 09:26





  Lopressor -  PO   25 mg





  BID [facility]   Administration





     





     





     





     








 Laboratory Tests











  07/11/18 07/11/18 07/11/18





  06:15 06:15 06:15


 


Chloride   


 


BUN   


 


Creatinine   


 


Hemoglobin A1c %   


 


BROOKS M-Fox   Pending 


 


GERONIMO Screen  Pending  


 


c-ANCA   Pending 


 


Proteinase 3 (PR3)   Pending 


 


p-ANCA   Pending 


 


Atypical p-ANCA   Pending 


 


Myeloperoxidase Ab   Pending 


 


Double Strand DNA Ab    Pending


 


Glomerular Base Memb Ab    Pending


 


Hepatitis A Ab Total   Pending 


 


Hep Bs Antigen   Pending 


 


Hep Bs Antibody   Pending 


 


Hep B Core Total Ab   Pending 


 


HCV Quantitation   Pending 


 


Hepatitis C RNA   Pending 














  07/12/18 07/12/18





  06:20 06:20


 


Chloride  113 H 


 


BUN  59 H 


 


Creatinine  4.3 H 


 


Hemoglobin A1c %   14.8 H


 


BROOKS M-Fox  


 


GERONIMO Screen  


 


c-ANCA  


 


Proteinase 3 (PR3)  


 


p-ANCA  


 


Atypical p-ANCA  


 


Myeloperoxidase Ab  


 


Double Strand DNA Ab  


 


Glomerular Base Memb Ab  


 


Hepatitis A Ab Total  


 


Hep Bs Antigen  


 


Hep Bs Antibody  


 


Hep B Core Total Ab  


 


HCV Quantitation  


 


Hepatitis C RNA  











Impression


1. JANEL


2. hyperkalemia


3. urinary retention


4. constipation


5. left leg edema


6. rash


7. DM








Plan


- renal function is improving


- potassium improved


- monitor urine output


- recall  for follow up, pt has hematuria


- flush martin if output decreased, may need CBI


- derm input appreciated, follow up biopsy


- renal workup in progress, serologies pending


- alc is elevated


- cont fluids for now


- edema is improving


- age appropriate screening


- will follow





Dr Fonseca

## 2018-07-12 NOTE — CON.ID
Consult


Consult Specialty:: infectious disdeases


Referred by:: 


Reason for Consultation:: cellulittis of the left leg,uti





- History of Present Illness


Chief Complaint: abd fullness and pain and swelling of the left leg


History of Present Illness: 





53 year old female with no significant PMH who presents with left leg swelling 

for about 4-5 days and feeling ill with abdominal fullness and constipation. 

She found herself going less to the bathroom and had some lower abdominal 

cramping. She denies fever but complained of chills. Denies chest pain, 

shortness of breath or palpitations. She denies paroxysmal nocturnal dyspnea or 

orthopnea. She denies vomiting or diarrhea. She denies headache or 

lightheadedness. She has edema extending up to her left thigh. She also has JANEL 

with elevation of creatinine and hyperkalemia. 


Id was called because patient continues to have swelling of her left leg with 

warmth


Patient mentions that currently she is better and her left leg has improved


since admission patient has been seen by cardiology and nephrology and also by 

dermatology as it seems patient had some rash over the left leg and scraping 

were taken from the leg


currently her leg is still warm and is extending to thigh also her wbc had 

increased.She has a foleys catheter and does have hematuria


patient otherwise feels better and her renal function are improving





- History Source


History Provided By: Patient


Limitations to Obtaining History: No Limitations





- Past Medical History


...LMP Comment: OVER 1 YEAR AGO


...Pregnant: No





- Alcohol/Substance Use


Hx Alcohol Use: No


History of Substance Use: reports: None





- Smoking History


Smoking history: Never smoked


Have you smoked in the past 12 months: No





Home Medications





- Allergies


Allergies/Adverse Reactions: 


 Allergies











Allergy/AdvReac Type Severity Reaction Status Date / Time


 


No Known Allergies Allergy   Verified 07/09/18 15:16














- Home Medications


Home Medications: 


Ambulatory Orders





NK [No Known Home Medication]  07/09/18 











Family Disease History





- Family Disease History


Family Disease History: CA: Mother (breast CA)





Review of Systems





- Review of Systems


Constitutional: reports: No Symptoms


Eyes: reports: No Symptoms


HENT: reports: No Symptoms


Neck: reports: No Symptoms


Cardiovascular: reports: No Symptoms


Respiratory: reports: No Symptoms


Gastrointestinal: reports: No Symptoms


Genitourinary: reports: Hematuria, Other (inability to pass urine)


Musculoskeletal: reports: No Symptoms


Integumentary: reports: Change in Color, Erythema (left leg)


Neurological: reports: No Symptoms


Hematology/Lymphatic: reports: No Symptoms


Psychiatric: reports: No Symptoms





Physical Exam


Vital Signs: 


 Vital Signs











Temperature  98.5 F   07/12/18 06:00


 


Pulse Rate  102 H  07/12/18 06:00


 


Respiratory Rate  20   07/12/18 06:00


 


Blood Pressure  120/72   07/12/18 06:00


 


O2 Sat by Pulse Oximetry (%)  99   07/11/18 21:00











Constitutional: Yes: No Distress, Calm, Obese


Eyes: Yes: Conjunctiva Clear


HENT: Yes: Atraumatic, Normocephalic


Cardiovascular: Yes: Regular Rate and Rhythm


Respiratory: Yes: Regular, CTA Bilaterally


Gastrointestinal: Yes: Normal Bowel Sounds, Soft


Renal/: Yes: Lala Present, Hematuria


Musculoskeletal: Yes: WNL


Extremities: Yes: Erythema (left leg with swelling), Other


Neurological: Yes: Alert, Oriented


Psychiatric: Yes: Alert, Oriented


Labs: 


 CBC, BMP





 07/12/18 06:20 





 07/12/18 06:20 











Imaging





- Results


Cat Scan: Report Reviewed, Image Reviewed


Ultrasound: Report Reviewed, Image Reviewed





Assessment/Plan





Problem List





- Problems


(1) Acute urinary retention


Code(s): R33.8 - OTHER RETENTION OF URINE   





(2) HTN (hypertension)


Code(s): I10 - ESSENTIAL (PRIMARY) HYPERTENSION   


Qualifiers: 


   Hypertension type: essential hypertension   Qualified Code(s): I10 - 

Essential (primary) hypertension   





(3) Hyperkalemia


Code(s): E87.5 - HYPERKALEMIA   





(4) Pedal edema


Code(s): R60.0 - LOCALIZED EDEMA   





(5) Acute bilateral obstructive uropathy


Code(s): N13.9 - OBSTRUCTIVE AND REFLUX UROPATHY, UNSPECIFIED   





6 cellulitis of the left leg








i think patient could very well have underlying pathology the u/s done could 

not be sensitive


i have started patient on zosyn as i think she does have cellulittis,


i think we should one more u/s


cardiolgy on case with nephro


rest continue current mgmt


we will see in couple of days how the leg looks and then decide on further abx

## 2018-07-12 NOTE — PN
Progress Note, Physician


History of Present Illness: 


Reports brisk post-obstructive diuresis, receiving IVF.








- Current Medication List


Current Medications: 


Active Medications





Acetaminophen (Tylenol -)  650 mg PO Q6H PRN


   PRN Reason: FEVER


   Last Admin: 07/12/18 02:55 Dose:  650 mg


Albuterol Sulfate (Ventolin 0.083% Nebulizer Soln -)  1 amp NEB Q1H PRN


   PRN Reason: SHORT OF BREATH/WHEEZING


   Last Admin: 07/10/18 13:50 Dose:  1 amp


Heparin Sodium (Porcine) (Heparin -)  5,000 unit SQ BID [facility]


   Last Admin: 07/12/18 09:26 Dose:  Not Given


Sodium Chloride (1/2 Normal Saline)  1,000 mls @ 125 mls/hr IV ASDIR [facility]


   Last Admin: 07/12/18 01:37 Dose:  125 mls/hr


Piperacillin Sod/Tazobactam (Sod 2.25 gm/ Dextrose)  50 mls @ 100 mls/hr IVPB 

Q8H-IV [facility]; Protocol


   Last Admin: 07/12/18 09:26 Dose:  100 mls/hr


Insulin Aspart (Novolog Vial Sliding Scale -)  1 vial SQ ACHS [facility]; Protocol


   Last Admin: 07/12/18 06:30 Dose:  2 units


Metoprolol Tartrate (Lopressor -)  25 mg PO BID [facility]


   Last Admin: 07/12/18 09:26 Dose:  25 mg











- Objective


Vital Signs: 


 Vital Signs











Temperature  98.5 F   07/12/18 06:00


 


Pulse Rate  102 H  07/12/18 06:00


 


Respiratory Rate  20   07/12/18 06:00


 


Blood Pressure  120/72   07/12/18 06:00


 


O2 Sat by Pulse Oximetry (%)  99   07/11/18 21:00











Constitutional: Yes: No Distress, Calm


Neck: Yes: Supple


Cardiovascular: Yes: Regular Rate and Rhythm, Tachycardia


Respiratory: Yes: Regular, CTA Bilaterally


Gastrointestinal: Yes: Normal Bowel Sounds, Soft, Abdomen, Obese


Genitourinary: Yes: Lala Present, Hematuria


Edema: Yes


Edema: LLE: 1+


Integumentary: Yes: Rash


Labs: 


 CBC, BMP





 07/12/18 06:20 





 07/12/18 06:20 





 INR, PTT











INR  1.19  (0.82-1.09)  H  07/09/18  16:58    














Problem List





- Problems


(1) Acute urinary retention


Code(s): R33.8 - OTHER RETENTION OF URINE   





(2) HTN (hypertension)


Code(s): I10 - ESSENTIAL (PRIMARY) HYPERTENSION   


Qualifiers: 


   Hypertension type: essential hypertension   Qualified Code(s): I10 - 

Essential (primary) hypertension   





(3) Hyperkalemia


Code(s): E87.5 - HYPERKALEMIA   





(4) Pedal edema


Code(s): R60.0 - LOCALIZED EDEMA   





(5) Acute bilateral obstructive uropathy


Code(s): N13.9 - OBSTRUCTIVE AND REFLUX UROPATHY, UNSPECIFIED   





Assessment/Plan


Transthoracic echocardiography to assess LV/RV and valvular function





1. Sinus tachycardia and hypertension improved


2. JANEL and hyperkalemia referable to obstructive uropathy with urinary 

retention and moderate bilateral hydronephrosis


3. Left pedal edema, etiology unclear as ultrasound was inconclusive for ruling 

out DVT. ?lymphedema 


4. Left leg rash r/o vasculitis





PLAN:


1. Lala placed with traumatic hematuria, urology management of urinary 

retention and hydronephrosis


2. Continue Lopressor 25 bid


3. Monitor electrolytes during post-obstructive diuresis including K level and 

renal function with IVF


4. Left CT venogram once renal fxn recovers


5. Renal and derm input appreciated, f/u shave biopsy


6. DVT prophylaxis

## 2018-07-12 NOTE — PN
Progress Note, Physician


History of Present Illness: 





still having hematuria





- Current Medication List


Current Medications: 


Active Medications





Acetaminophen (Tylenol -)  650 mg PO Q6H PRN


   PRN Reason: FEVER


   Last Admin: 07/12/18 02:55 Dose:  650 mg


Albuterol Sulfate (Ventolin 0.083% Nebulizer Soln -)  1 amp NEB Q1H PRN


   PRN Reason: SHORT OF BREATH/WHEEZING


   Last Admin: 07/10/18 13:50 Dose:  1 amp


Heparin Sodium (Porcine) (Heparin -)  5,000 unit SQ BID [facility]


   Last Admin: 07/12/18 09:26 Dose:  Not Given


Sodium Chloride (1/2 Normal Saline)  1,000 mls @ 125 mls/hr IV ASDIR [facility]


   Last Admin: 07/12/18 01:37 Dose:  125 mls/hr


Piperacillin Sod/Tazobactam (Sod 2.25 gm/ Dextrose)  50 mls @ 100 mls/hr IVPB 

Q8H-IV [facility]; Protocol


   Last Admin: 07/12/18 09:26 Dose:  100 mls/hr


Insulin Aspart (Novolog Vial Sliding Scale -)  1 vial SQ ACHS [facility]; Protocol


   Last Admin: 07/12/18 12:25 Dose:  4 units


Metoprolol Tartrate (Lopressor -)  25 mg PO BID [facility]


   Last Admin: 07/12/18 09:26 Dose:  25 mg











- Objective


Vital Signs: 


 Vital Signs











Temperature  99.7 F H  07/12/18 14:00


 


Pulse Rate  119 H  07/12/18 14:00


 


Respiratory Rate  20   07/12/18 06:00


 


Blood Pressure  134/72   07/12/18 14:00


 


O2 Sat by Pulse Oximetry (%)  99   07/11/18 21:00











Constitutional: Yes: No Distress


HENT: Yes: Atraumatic


Neck: Yes: Supple


Cardiovascular: Yes: Regular Rate and Rhythm


Respiratory: Yes: CTA Bilaterally


Gastrointestinal: Yes: Normal Bowel Sounds


Edema: Yes


Edema: RLE: 1+ (erythema)


Neurological: Yes: Alert, Oriented


Labs: 


 CBC, BMP





 07/12/18 06:20 





 07/12/18 06:20 





 INR, PTT











INR  1.19  (0.82-1.09)  H  07/09/18  16:58    














Problem List





- Problems


(1) Acute renal failure (ARF)


Assessment/Plan: 


bloody output via martin


awaiting urology


Code(s): N17.9 - ACUTE KIDNEY FAILURE, UNSPECIFIED   


Qualifiers: 


   Acute renal failure type: unspecified   Qualified Code(s): N17.9 - Acute 

kidney failure, unspecified   





(2) Acute urinary retention


Assessment/Plan: 


need martin in place


renal on board


Code(s): R33.8 - OTHER RETENTION OF URINE   





(3) HTN (hypertension)


Assessment/Plan: 


on meds 


monitor





Code(s): I10 - ESSENTIAL (PRIMARY) HYPERTENSION   


Qualifiers: 


   Hypertension type: essential hypertension   Qualified Code(s): I10 - 

Essential (primary) hypertension   





(4) Hyperkalemia


Assessment/Plan: 


resolved


Code(s): E87.5 - HYPERKALEMIA   





(5) Pedal edema


Code(s): R60.0 - LOCALIZED EDEMA   





(6) Acute bilateral obstructive uropathy


Code(s): N13.9 - OBSTRUCTIVE AND REFLUX UROPATHY, UNSPECIFIED   





(7) Rash and nonspecific skin eruption


Assessment/Plan: 


derm consult..reviewed


cellulitis


start abx


biopsy done by derm


Code(s): R21 - RASH AND OTHER NONSPECIFIC SKIN ERUPTION

## 2018-07-12 NOTE — CONSULT
Consult - text type





- Consultation


Consultation Note: 





Dermatology Procedure


consent for skin biopsy obtained


Area on left ant calf prepped and sterilized


1% lidocaine infiltrated SQ


shave biopsy taken and submitted to pathology


application of bandage


will follow result

## 2018-07-12 NOTE — PN
Progress Note (short form)





- Note


Progress Note: 





gross hematuria 


requiring intermittant irrigation secondary to clots


Irrigated at bedside, but clots appear too large for catheter


will plan for cysto/evac clots fulguration of bleeding in am

## 2018-07-13 LAB
ANION GAP SERPL CALC-SCNC: 10 MMOL/L (ref 8–16)
BUN SERPL-MCNC: 45 MG/DL (ref 7–18)
C-ANCA TITR SER: (no result) TITER
CALCIUM SERPL-MCNC: 7.8 MG/DL (ref 8.5–10.1)
CHLORIDE SERPL-SCNC: 111 MMOL/L (ref 98–107)
CO2 SERPL-SCNC: 24 MMOL/L (ref 21–32)
CREAT SERPL-MCNC: 2.7 MG/DL (ref 0.55–1.02)
GBM AB SER-MCNC: 3 UNITS (ref 0–20)
GLUCOSE SERPL-MCNC: 87 MG/DL (ref 74–106)
P-ANCA TITR SER IF: (no result) TITER
POTASSIUM SERPLBLD-SCNC: 4.2 MMOL/L (ref 3.5–5.1)
PROTEINASE3 AB SER-ACNC: <3.5 U/ML (ref 0–3.5)
SODIUM SERPL-SCNC: 145 MMOL/L (ref 136–145)

## 2018-07-13 PROCEDURE — 0TBB8ZX EXCISION OF BLADDER, VIA NATURAL OR ARTIFICIAL OPENING ENDOSCOPIC, DIAGNOSTIC: ICD-10-PCS | Performed by: UROLOGY

## 2018-07-13 PROCEDURE — 0T5B8ZZ DESTRUCTION OF BLADDER, VIA NATURAL OR ARTIFICIAL OPENING ENDOSCOPIC: ICD-10-PCS | Performed by: UROLOGY

## 2018-07-13 RX ADMIN — METOPROLOL TARTRATE SCH MG: 25 TABLET, FILM COATED ORAL at 09:22

## 2018-07-13 RX ADMIN — SODIUM CHLORIDE SCH: 4.5 INJECTION, SOLUTION INTRAVENOUS at 17:39

## 2018-07-13 RX ADMIN — PIPERACILLIN SODIUM AND TAZOBACTAM SODIUM SCH MLS/HR: .25; 2 INJECTION, POWDER, LYOPHILIZED, FOR SOLUTION INTRAVENOUS at 02:03

## 2018-07-13 RX ADMIN — ACETAMINOPHEN PRN MG: 325 TABLET ORAL at 21:03

## 2018-07-13 RX ADMIN — INSULIN ASPART SCH: 100 INJECTION, SOLUTION INTRAVENOUS; SUBCUTANEOUS at 13:57

## 2018-07-13 RX ADMIN — INSULIN ASPART SCH: 100 INJECTION, SOLUTION INTRAVENOUS; SUBCUTANEOUS at 06:32

## 2018-07-13 RX ADMIN — INSULIN ASPART SCH UNITS: 100 INJECTION, SOLUTION INTRAVENOUS; SUBCUTANEOUS at 17:21

## 2018-07-13 RX ADMIN — METOPROLOL TARTRATE SCH MG: 25 TABLET, FILM COATED ORAL at 21:04

## 2018-07-13 RX ADMIN — PIPERACILLIN SODIUM AND TAZOBACTAM SODIUM SCH MLS/HR: .25; 2 INJECTION, POWDER, LYOPHILIZED, FOR SOLUTION INTRAVENOUS at 18:35

## 2018-07-13 RX ADMIN — INSULIN ASPART SCH UNITS: 100 INJECTION, SOLUTION INTRAVENOUS; SUBCUTANEOUS at 21:05

## 2018-07-13 RX ADMIN — HEPARIN SODIUM SCH: 5000 INJECTION, SOLUTION INTRAVENOUS; SUBCUTANEOUS at 09:23

## 2018-07-13 RX ADMIN — PIPERACILLIN SODIUM AND TAZOBACTAM SODIUM SCH MLS/HR: .25; 2 INJECTION, POWDER, LYOPHILIZED, FOR SOLUTION INTRAVENOUS at 09:22

## 2018-07-13 NOTE — PN
Progress Note, Physician


History of Present Illness: 





Pt seen and examined at bedside. She is awake and alert. She went for cysto. 





- Current Medication List


Current Medications: 


Active Medications





Acetaminophen (Tylenol -)  650 mg PO Q6H PRN


   PRN Reason: FEVER


Albuterol Sulfate (Ventolin 0.083% Nebulizer Soln -)  1 amp NEB Q1H PRN


   PRN Reason: SHORT OF BREATH/WHEEZING


Sodium Chloride (1/2 Normal Saline)  1,000 mls @ 125 mls/hr IV ASDIR [facility]


Piperacillin Sod/Tazobactam (Sod 2.25 gm/ Dextrose)  50 mls @ 100 mls/hr IVPB 

Q8H-IV [facility]; Protocol


Insulin Aspart (Novolog Vial Sliding Scale -)  1 vial SQ ACHS [facility]; Protocol


Metoprolol Tartrate (Lopressor -)  25 mg PO BID [facility]











- Objective


Vital Signs: 


 Vital Signs











Temperature  98.2 F   07/13/18 15:47


 


Pulse Rate  94 H  07/13/18 15:47


 


Respiratory Rate  20   07/13/18 15:47


 


Blood Pressure  115/72   07/13/18 15:47


 


O2 Sat by Pulse Oximetry (%)  97   07/13/18 15:47











Constitutional: Yes: Calm


Eyes: Yes: Conjunctiva Clear


HENT: Yes: Atraumatic


Cardiovascular: Yes: S1, S2


Respiratory: Yes: CTA Bilaterally


Gastrointestinal: Yes: Soft


Genitourinary: Yes: Lala Present


Musculoskeletal: Yes: WNL


Edema: Yes


Edema: LLE: Trace


Integumentary: Yes: Rash


Neurological: Yes: Oriented


Psychiatric: Yes: Oriented


Labs: 


 CBC, BMP





 07/12/18 06:20 





 07/13/18 05:30 





 INR, PTT











INR  1.19  (0.82-1.09)  H  07/09/18  16:58    














Problem List





- Problems


(1) Acute renal failure (ARF)


Code(s): N17.9 - ACUTE KIDNEY FAILURE, UNSPECIFIED   


Qualifiers: 


   Acute renal failure type: unspecified   Qualified Code(s): N17.9 - Acute 

kidney failure, unspecified   





(2) Acute urinary retention


Code(s): R33.8 - OTHER RETENTION OF URINE   





(3) HTN (hypertension)


Code(s): I10 - ESSENTIAL (PRIMARY) HYPERTENSION   


Qualifiers: 


   Hypertension type: essential hypertension   Qualified Code(s): I10 - 

Essential (primary) hypertension   





(4) Hyperkalemia


Code(s): E87.5 - HYPERKALEMIA   





Assessment/Plan


 Current Medications











Generic Name Dose Route Start Last Admin





  Trade Name Freq  PRN Reason Stop Dose Admin


 


Acetaminophen  650 mg  07/13/18 14:34  





  Tylenol -  PO   





  Q6H PRN   





  FEVER   





     





     





     


 


Albuterol Sulfate  1 amp  07/13/18 14:34  





  Ventolin 0.083% Nebulizer Soln -  NEB   





  Q1H PRN   





  SHORT OF BREATH/WHEEZING   





     





     





     


 


Sodium Chloride  1,000 mls @ 125 mls/hr  07/13/18 14:34  





  1/2 Normal Saline  IV   





  ASDIR [facility]   





     





     





     





     


 


Piperacillin Sod/Tazobactam  50 mls @ 100 mls/hr  07/13/18 18:00  





  Sod 2.25 gm/ Dextrose  IVPB   





  Q8H-IV [facility]   





     





     





  Protocol   





     


 


Insulin Aspart  1 vial  07/13/18 16:30  





  Novolog Vial Sliding Scale -  SQ   





  [facility]   





     





     





  Protocol   





     


 


Metoprolol Tartrate  25 mg  07/13/18 22:00  





  Lopressor -  PO   





  BID [facility]   





     





     





     





     








 Laboratory Tests











  07/11/18 07/11/18 07/11/18





  06:15 06:15 06:15


 


BUN   


 


Creatinine   


 


BROOKS M-Fox   Pending 


 


GERONIMO Screen  Negative  


 


c-ANCA   <1:20 


 


Proteinase 3 (PR3)   <3.5 


 


p-ANCA   <1:20 


 


Atypical p-ANCA   <1:20 


 


Myeloperoxidase Ab   <9.0 


 


Double Strand DNA Ab    <1


 


Glomerular Base Memb Ab    3


 


Hep Bs Antigen   Negative 


 


Hep Bs Antibody   Reactive 


 


Hep B Core Total Ab   Positive H 














  07/13/18





  05:30


 


BUN  45 H


 


Creatinine  2.7 H


 


BROOKS M-Fox 


 


GERONIMO Screen 


 


c-ANCA 


 


Proteinase 3 (PR3) 


 


p-ANCA 


 


Atypical p-ANCA 


 


Myeloperoxidase Ab 


 


Double Strand DNA Ab 


 


Glomerular Base Memb Ab 


 


Hep Bs Antigen 


 


Hep Bs Antibody 


 


Hep B Core Total Ab 











Impression


1. JANEL


2. hyperkalemia


3. urinary retention


4. constipation


5. left leg edema


6. rash


7. DM


8. hx of hep b exposure





Plan


- renal function is improved


- repeat labs in am


- edema is improved


- hematuria improved


- s/p cysto, follow up biopsy


- follow up skin biopsy


- serologic workup negative so far


- diabetic diet


- age appropriate screening


- will follow





Dr Fonseca

## 2018-07-13 NOTE — PN
Progress Note, Physician


History of Present Illness: 





s/p cystoscopy and biopsy


urine now clear in bag





- Current Medication List


Current Medications: 


Active Medications





Acetaminophen (Tylenol -)  650 mg PO Q6H PRN


   PRN Reason: FEVER


Albuterol Sulfate (Ventolin 0.083% Nebulizer Soln -)  1 amp NEB Q1H PRN


   PRN Reason: SHORT OF BREATH/WHEEZING


Sodium Chloride (1/2 Normal Saline)  1,000 mls @ 125 mls/hr IV ASDIR [facility]


Piperacillin Sod/Tazobactam (Sod 2.25 gm/ Dextrose)  50 mls @ 100 mls/hr IVPB 

Q8H-IV [facility]; Protocol


Insulin Aspart (Novolog Vial Sliding Scale -)  1 vial SQ ACHS [facility]; Protocol


Metoprolol Tartrate (Lopressor -)  25 mg PO BID [facility]











- Objective


Vital Signs: 


 Vital Signs











Temperature  98.2 F   07/13/18 15:47


 


Pulse Rate  94 H  07/13/18 15:47


 


Respiratory Rate  20   07/13/18 15:47


 


Blood Pressure  115/72   07/13/18 15:47


 


O2 Sat by Pulse Oximetry (%)  97   07/13/18 15:47











Constitutional: Yes: No Distress


HENT: Yes: Atraumatic


Neck: Yes: Supple


Cardiovascular: Yes: Regular Rate and Rhythm


Respiratory: Yes: CTA Bilaterally


Gastrointestinal: Yes: Normal Bowel Sounds


Genitourinary: Yes: Hematuria


Extremities: Yes: Other (llex less warm and swollen)


Neurological: Yes: Alert, Oriented


Labs: 


 CBC, BMP





 07/12/18 06:20 





 07/13/18 05:30 





 INR, PTT











INR  1.19  (0.82-1.09)  H  07/09/18  16:58    














Problem List





- Problems


(1) Acute renal failure (ARF)


Assessment/Plan: 


urine out puts are clear


cr improving


clinically much better 


Code(s): N17.9 - ACUTE KIDNEY FAILURE, UNSPECIFIED   


Qualifiers: 


   Acute renal failure type: unspecified   Qualified Code(s): N17.9 - Acute 

kidney failure, unspecified   





(2) Acute urinary retention


Assessment/Plan: 


martin in place


Code(s): R33.8 - OTHER RETENTION OF URINE   





(3) HTN (hypertension)


Assessment/Plan: 


on meds 


monitor





Code(s): I10 - ESSENTIAL (PRIMARY) HYPERTENSION   


Qualifiers: 


   Hypertension type: essential hypertension   Qualified Code(s): I10 - 

Essential (primary) hypertension   





(4) Hyperkalemia


Assessment/Plan: 


resolved


Code(s): E87.5 - HYPERKALEMIA   





(5) Pedal edema


Code(s): R60.0 - LOCALIZED EDEMA   





(6) Acute bilateral obstructive uropathy


Code(s): N13.9 - OBSTRUCTIVE AND REFLUX UROPATHY, UNSPECIFIED   





(7) Rash and nonspecific skin eruption


Code(s): R21 - RASH AND OTHER NONSPECIFIC SKIN ERUPTION

## 2018-07-13 NOTE — OP
Operative Note





- Note:


Operative Date: 07/13/18


Pre-Operative Diagnosis: gross heme, clot retention


Operation: cysto, evac of clots, fulg bleeding, bladder bx


Findings: 





clot retention, hemorrhagic cystitis


Post-Operative Diagnosis: Same as Pre-op


Surgeon: Chato Chaves


Anesthesia: Spinal


Specimens Removed: clots, bladder bx


Operative Report Dictated: Yes

## 2018-07-13 NOTE — PN
Progress Note, Physician


History of Present Illness: 





clinically patient is stable


patient going for evacuation of the clot


probably cystoscopy








- Current Medication List


Current Medications: 


Active Medications





Acetaminophen (Tylenol -)  650 mg PO Q6H PRN


   PRN Reason: FEVER


   Last Admin: 07/12/18 17:41 Dose:  650 mg


Albuterol Sulfate (Ventolin 0.083% Nebulizer Soln -)  1 amp NEB Q1H PRN


   PRN Reason: SHORT OF BREATH/WHEEZING


   Last Admin: 07/10/18 13:50 Dose:  1 amp


Heparin Sodium (Porcine) (Heparin -)  5,000 unit SQ BID [facility]


   Last Admin: 07/13/18 09:23 Dose:  Not Given


Sodium Chloride (1/2 Normal Saline)  1,000 mls @ 125 mls/hr IV ASDIR [facility]


   Last Admin: 07/12/18 22:25 Dose:  125 mls/hr


Piperacillin Sod/Tazobactam (Sod 2.25 gm/ Dextrose)  50 mls @ 100 mls/hr IVPB 

Q8H-IV [facility]; Protocol


   Last Admin: 07/13/18 09:22 Dose:  100 mls/hr


Insulin Aspart (Novolog Vial Sliding Scale -)  1 vial SQ ACHS [facility]; Protocol


   Last Admin: 07/13/18 06:32 Dose:  Not Given


Metoprolol Tartrate (Lopressor -)  25 mg PO BID [facility]


   Last Admin: 07/13/18 09:22 Dose:  25 mg











- Objective


Vital Signs: 


 Vital Signs











Temperature  97.9 F   07/13/18 11:57


 


Pulse Rate  97 H  07/13/18 11:57


 


Respiratory Rate  20   07/13/18 11:57


 


Blood Pressure  127/82   07/13/18 11:57


 


O2 Sat by Pulse Oximetry (%)  97   07/13/18 08:38











Constitutional: Yes: No Distress, Calm


Cardiovascular: Yes: Regular Rate and Rhythm


Respiratory: Yes: Regular, CTA Bilaterally


Gastrointestinal: Yes: Normal Bowel Sounds, Soft


Musculoskeletal: Yes: WNL


Extremities: Yes: Other (left leg redness much better swelling less)


Integumentary: Yes: Erythema (improving)


Neurological: Yes: Alert, Oriented


Psychiatric: Yes: Alert, Oriented


Labs: 


 CBC, BMP





 07/12/18 06:20 





 07/13/18 05:30 





 INR, PTT











INR  1.19  (0.82-1.09)  H  07/09/18  16:58    














Assessment/Plan





Problem List





- Problems


(1) Acute urinary retention


Code(s): R33.8 - OTHER RETENTION OF URINE   





(2) HTN (hypertension)


Code(s): I10 - ESSENTIAL (PRIMARY) HYPERTENSION   


Qualifiers: 


   Hypertension type: essential hypertension   Qualified Code(s): I10 - 

Essential (primary) hypertension   





(3) Hyperkalemia


Code(s): E87.5 - HYPERKALEMIA   





(4) Pedal edema


Code(s): R60.0 - LOCALIZED EDEMA   





(5) Acute bilateral obstructive uropathy


Code(s): N13.9 - OBSTRUCTIVE AND REFLUX UROPATHY, UNSPECIFIED   





6 cellulitis of the left leg





plan


continue abx for now


will monitor the leg


if improving then will switch to oral


rest continue current mgmt

## 2018-07-13 NOTE — PN
Progress Note, Physician


History of Present Illness: 


Continued hematuria with large clots planned for cystoscopy and clot 

evacuation. Repeat vascular U/S negative for DVT LLE.








- Current Medication List


Current Medications: 


Active Medications





Acetaminophen (Tylenol -)  650 mg PO Q6H PRN


   PRN Reason: FEVER


   Last Admin: 07/12/18 17:41 Dose:  650 mg


Albuterol Sulfate (Ventolin 0.083% Nebulizer Soln -)  1 amp NEB Q1H PRN


   PRN Reason: SHORT OF BREATH/WHEEZING


   Last Admin: 07/10/18 13:50 Dose:  1 amp


Heparin Sodium (Porcine) (Heparin -)  5,000 unit SQ BID [facility]


   Last Admin: 07/13/18 09:23 Dose:  Not Given


Sodium Chloride (1/2 Normal Saline)  1,000 mls @ 125 mls/hr IV ASDIR [facility]


   Last Admin: 07/12/18 22:25 Dose:  125 mls/hr


Piperacillin Sod/Tazobactam (Sod 2.25 gm/ Dextrose)  50 mls @ 100 mls/hr IVPB 

Q8H-IV [facility]; Protocol


   Last Admin: 07/13/18 09:22 Dose:  100 mls/hr


Insulin Aspart (Novolog Vial Sliding Scale -)  1 vial SQ ACHS [facility]; Protocol


   Last Admin: 07/13/18 06:32 Dose:  Not Given


Metoprolol Tartrate (Lopressor -)  25 mg PO BID [facility]


   Last Admin: 07/13/18 09:22 Dose:  25 mg











- Objective


Vital Signs: 


 Vital Signs











Temperature  98.1 F   07/13/18 06:00


 


Pulse Rate  99 H  07/13/18 06:00


 


Respiratory Rate  20   07/13/18 08:38


 


Blood Pressure  120/63   07/13/18 06:00


 


O2 Sat by Pulse Oximetry (%)  97   07/13/18 08:38











Constitutional: Yes: No Distress, Calm


Neck: Yes: Supple


Cardiovascular: Yes: Regular Rate and Rhythm


Respiratory: Yes: Regular, CTA Bilaterally


Gastrointestinal: Yes: Soft, Hypoactive Bowel Sounds


Genitourinary: Yes: Lala Present, Hematuria


Edema: Yes


Edema: LLE: 1+


Labs: 


 CBC, BMP





 07/12/18 06:20 





 07/13/18 05:30 





 INR, PTT











INR  1.19  (0.82-1.09)  H  07/09/18  16:58    














- ....Imaging


Ultrasound: Report Reviewed (Negative DVT LLE)


EKG: Report Reviewed (Tele: SR)





Problem List





- Problems


(1) Acute urinary retention


Code(s): R33.8 - OTHER RETENTION OF URINE   





(2) HTN (hypertension)


Code(s): I10 - ESSENTIAL (PRIMARY) HYPERTENSION   


Qualifiers: 


   Hypertension type: essential hypertension   Qualified Code(s): I10 - 

Essential (primary) hypertension   





(3) Hyperkalemia


Code(s): E87.5 - HYPERKALEMIA   





(4) Pedal edema


Code(s): R60.0 - LOCALIZED EDEMA   





(5) Acute bilateral obstructive uropathy


Code(s): N13.9 - OBSTRUCTIVE AND REFLUX UROPATHY, UNSPECIFIED   





Assessment/Plan


Transthoracic echocardiography to assess LV/RV and valvular function





1. Sinus tachycardia and hypertension improved


2. JANEL and hyperkalemia referable to obstructive uropathy with urinary 

retention and moderate bilateral hydronephrosis improving


3. Left pedal edema, etiology unclear as ultrasound was inconclusive for ruling 

out DVT.


4. Left leg rash r/o vasculitis, cellulitis





PLAN:


1. Planned for cystoscopy and clot evacuation


2. Continue Lopressor 25 bid, empiric abx


3. Monitor electrolytes during post-obstructive diuresis including K level and 

renal function recovery


4. Renal and derm input appreciated, f/u shave biopsy


5. DVT prophylaxis, d/c telemetry

## 2018-07-14 LAB
ANION GAP SERPL CALC-SCNC: 12 MMOL/L (ref 8–16)
BUN SERPL-MCNC: 36 MG/DL (ref 7–18)
CALCIUM SERPL-MCNC: 8 MG/DL (ref 8.5–10.1)
CHLORIDE SERPL-SCNC: 107 MMOL/L (ref 98–107)
CO2 SERPL-SCNC: 22 MMOL/L (ref 21–32)
CREAT SERPL-MCNC: 2.3 MG/DL (ref 0.55–1.02)
GLUCOSE SERPL-MCNC: 216 MG/DL (ref 74–106)
POTASSIUM SERPLBLD-SCNC: 4.7 MMOL/L (ref 3.5–5.1)
SODIUM SERPL-SCNC: 141 MMOL/L (ref 136–145)

## 2018-07-14 RX ADMIN — SODIUM CHLORIDE SCH MLS/HR: 4.5 INJECTION, SOLUTION INTRAVENOUS at 16:54

## 2018-07-14 RX ADMIN — METOPROLOL TARTRATE SCH MG: 25 TABLET, FILM COATED ORAL at 21:25

## 2018-07-14 RX ADMIN — SODIUM CHLORIDE SCH MLS/HR: 4.5 INJECTION, SOLUTION INTRAVENOUS at 00:15

## 2018-07-14 RX ADMIN — SODIUM CHLORIDE SCH MLS/HR: 4.5 INJECTION, SOLUTION INTRAVENOUS at 09:12

## 2018-07-14 RX ADMIN — ACETAMINOPHEN PRN MG: 325 TABLET ORAL at 09:11

## 2018-07-14 RX ADMIN — PIPERACILLIN SODIUM AND TAZOBACTAM SODIUM SCH MLS/HR: .25; 2 INJECTION, POWDER, LYOPHILIZED, FOR SOLUTION INTRAVENOUS at 09:18

## 2018-07-14 RX ADMIN — INSULIN ASPART SCH UNITS: 100 INJECTION, SOLUTION INTRAVENOUS; SUBCUTANEOUS at 16:55

## 2018-07-14 RX ADMIN — INSULIN ASPART SCH UNITS: 100 INJECTION, SOLUTION INTRAVENOUS; SUBCUTANEOUS at 21:24

## 2018-07-14 RX ADMIN — PIPERACILLIN SODIUM AND TAZOBACTAM SODIUM SCH MLS/HR: .25; 2 INJECTION, POWDER, LYOPHILIZED, FOR SOLUTION INTRAVENOUS at 16:59

## 2018-07-14 RX ADMIN — INSULIN ASPART SCH UNITS: 100 INJECTION, SOLUTION INTRAVENOUS; SUBCUTANEOUS at 06:30

## 2018-07-14 RX ADMIN — INSULIN ASPART SCH UNITS: 100 INJECTION, SOLUTION INTRAVENOUS; SUBCUTANEOUS at 11:37

## 2018-07-14 RX ADMIN — ACETAMINOPHEN PRN MG: 325 TABLET ORAL at 03:05

## 2018-07-14 RX ADMIN — METOPROLOL TARTRATE SCH MG: 25 TABLET, FILM COATED ORAL at 09:10

## 2018-07-14 RX ADMIN — PIPERACILLIN SODIUM AND TAZOBACTAM SODIUM SCH MLS/HR: .25; 2 INJECTION, POWDER, LYOPHILIZED, FOR SOLUTION INTRAVENOUS at 02:46

## 2018-07-14 NOTE — PN
Progress Note, Physician


History of Present Illness: 





s/p cystoscopy and biopsy


urine now clear in bag





- Current Medication List


Current Medications: 


Active Medications





Acetaminophen (Tylenol -)  650 mg PO Q6H PRN


   PRN Reason: FEVER


   Last Admin: 07/14/18 09:11 Dose:  650 mg


Albuterol Sulfate (Ventolin 0.083% Nebulizer Soln -)  1 amp NEB Q1H PRN


   PRN Reason: SHORT OF BREATH/WHEEZING


Piperacillin Sod/Tazobactam (Sod 2.25 gm/ Dextrose)  50 mls @ 100 mls/hr IVPB 

Q8H-IV [facility]; Protocol


   Last Admin: 07/14/18 16:59 Dose:  100 mls/hr


Sodium Chloride (1/2 Normal Saline)  1,000 mls @ 100 mls/hr IV ASDIR [facility]


   Last Admin: 07/14/18 16:54 Dose:  100 mls/hr


Insulin Aspart (Novolog Vial Sliding Scale -)  1 vial SQ ACHS [facility]; Protocol


   Last Admin: 07/14/18 16:55 Dose:  4 units


Metoprolol Tartrate (Lopressor -)  25 mg PO BID [facility]


   Last Admin: 07/14/18 09:10 Dose:  25 mg











- Objective


Vital Signs: 


 Vital Signs











Temperature  98.7 F   07/14/18 14:50


 


Pulse Rate  90   07/14/18 14:50


 


Respiratory Rate  18   07/14/18 14:50


 


Blood Pressure  140/82   07/14/18 14:50


 


O2 Sat by Pulse Oximetry (%)  98   07/14/18 08:11











Constitutional: Yes: No Distress


HENT: Yes: Atraumatic


Neck: Yes: Supple


Cardiovascular: Yes: Regular Rate and Rhythm


Respiratory: Yes: CTA Bilaterally


Gastrointestinal: Yes: Normal Bowel Sounds


Extremities: Yes: WNL


Neurological: Yes: Alert, Oriented


Labs: 


 CBC, BMP





 07/12/18 06:20 





 07/14/18 06:55 





 INR, PTT











INR  1.19  (0.82-1.09)  H  07/09/18  16:58    














Problem List





- Problems


(1) Acute renal failure (ARF)


Assessment/Plan: 


urine out puts are clear


cr improving


clinically much better 


Code(s): N17.9 - ACUTE KIDNEY FAILURE, UNSPECIFIED   


Qualifiers: 


   Acute renal failure type: unspecified   Qualified Code(s): N17.9 - Acute 

kidney failure, unspecified   





(2) Acute urinary retention


Assessment/Plan: 


martin in place


Code(s): R33.8 - OTHER RETENTION OF URINE   





(3) HTN (hypertension)


Assessment/Plan: 


on meds 


monitor





Code(s): I10 - ESSENTIAL (PRIMARY) HYPERTENSION   


Qualifiers: 


   Hypertension type: essential hypertension   Qualified Code(s): I10 - 

Essential (primary) hypertension   





(4) Hyperkalemia


Assessment/Plan: 


resolved


Code(s): E87.5 - HYPERKALEMIA   





(5) Pedal edema


Code(s): R60.0 - LOCALIZED EDEMA   





(6) Acute bilateral obstructive uropathy


Code(s): N13.9 - OBSTRUCTIVE AND REFLUX UROPATHY, UNSPECIFIED   





(7) Rash and nonspecific skin eruption


Assessment/Plan: 


derm consult..reviewed


cellulitis


start abx


biopsy done by derm


Code(s): R21 - RASH AND OTHER NONSPECIFIC SKIN ERUPTION

## 2018-07-14 NOTE — PN
Progress Note, Physician


History of Present Illness: 





doing well


no complaints


walking





- Current Medication List


Current Medications: 


Active Medications





Acetaminophen (Tylenol -)  650 mg PO Q6H PRN


   PRN Reason: FEVER


   Last Admin: 07/14/18 09:11 Dose:  650 mg


Albuterol Sulfate (Ventolin 0.083% Nebulizer Soln -)  1 amp NEB Q1H PRN


   PRN Reason: SHORT OF BREATH/WHEEZING


Piperacillin Sod/Tazobactam (Sod 2.25 gm/ Dextrose)  50 mls @ 100 mls/hr IVPB 

Q8H-IV [facility]; Protocol


   Last Admin: 07/14/18 09:18 Dose:  100 mls/hr


Sodium Chloride (1/2 Normal Saline)  1,000 mls @ 100 mls/hr IV ASDIR [facility]


Insulin Aspart (Novolog Vial Sliding Scale -)  1 vial SQ ACHS [facility]; Protocol


   Last Admin: 07/14/18 11:37 Dose:  8 units


Metoprolol Tartrate (Lopressor -)  25 mg PO BID [facility]


   Last Admin: 07/14/18 09:10 Dose:  25 mg











- Objective


Vital Signs: 


 Vital Signs











Temperature  97.9 F   07/14/18 08:08


 


Pulse Rate  90   07/14/18 08:08


 


Respiratory Rate  18   07/14/18 08:08


 


Blood Pressure  137/75   07/14/18 08:08


 


O2 Sat by Pulse Oximetry (%)  98   07/14/18 08:11











Constitutional: Yes: No Distress, Calm


Cardiovascular: Yes: Regular Rate and Rhythm


Respiratory: Yes: Regular, CTA Bilaterally


Gastrointestinal: Yes: Normal Bowel Sounds, Soft


Musculoskeletal: Yes: WNL


Extremities: Yes: Other


Integumentary: Yes: Erythema (on the left leg has nearly resolved)


Psychiatric: Yes: Alert, Oriented


Labs: 


 CBC, BMP





 07/12/18 06:20 





 07/14/18 06:55 





 INR, PTT











INR  1.19  (0.82-1.09)  H  07/09/18  16:58    














Assessment/Plan





Problem List





- Problems


(1) Acute urinary retention


Code(s): R33.8 - OTHER RETENTION OF URINE   





(2) HTN (hypertension)


Code(s): I10 - ESSENTIAL (PRIMARY) HYPERTENSION   


Qualifiers: 


   Hypertension type: essential hypertension   Qualified Code(s): I10 - 

Essential (primary) hypertension   





(3) Hyperkalemia


Code(s): E87.5 - HYPERKALEMIA   





(4) Pedal edema


Code(s): R60.0 - LOCALIZED EDEMA   





(5) Acute bilateral obstructive uropathy


Code(s): N13.9 - OBSTRUCTIVE AND REFLUX UROPATHY, UNSPECIFIED   





6 cellulitis of the left leg





plan


continue abx for now


will switch to oral abx from tomorrow


await for biopsy results


rest as per nephro

## 2018-07-14 NOTE — OP
DATE OF OPERATION:

 

DATE OF DICTATION:  07/13/2018

 

PREOPERATIVE DIAGNOSIS:  Gross hematuria, clot retention.  

 

POSTOPERATIVE DIAGNOSIS:  Gross hematuria, clot retention, plus hemorrhagic cystitis.

 

 

PROCEDURE:  Cystoscopy, evacuation of clots, fulguration of bleeding, and bladder

biopsy.  

 

SURGEON:  Ish Guerra MD

 

INDICATIONS:  Patient is a 53-year-old female admitted with urinary retention,

urethral stricture, had persistent gross hematuria with clot retension, was taken to

the OR for cystoscopy evaluation and treatment.  

 

DESCRIPTION OF PROCEDURE:  Patient was taken to the OR, placed supine on the table. 

With cardiac monitoring administered, spinal anesthetic was given, and she was

prepped and draped in dorsal lithotomy position.  The existing Lala catheter was

then removed, and a 26-sheath resectoscope with the visual obturator was inserted

into the urethra without difficulty.  There was evidence of prior stricture of

urethra, but the scope was easily advanced.  The bladder was visualized.  There were

multiple large clots in the bladder.  These were removed with Ellik evacuator in

their entirety, so the entire bladder could be inspected.  With all the clots

removed, bilateral ureteral orifices were seen in their normal anatomic position.

There was significant bladder wall erythema both posteriorly and at the dome with

some bleeding.  These sites were fulgurated, and a representative sample was biopsied

with a transurethral resection, the base of which was also fulgurated.  No evidence

of tumors was noted.  No evidence of any active bleeding.  At this point, the

resectoscope was then removed, and a 22-Niuean Lala was then placed to straight

drainage.   Pink-tinged urine was retrieved.  Patient was awoken from anesthesia and

transferred to recovery room in stable condition.  There were no complications.

 

ESTIMATED BLOOD LOSS:  Minimal.

 

 

ISH GUERRA M.D.

 

[id number]

DD: 07/13/2018 14:16

DT: 07/14/2018 07:55

Job #:  [id number]

## 2018-07-14 NOTE — PN
Progress Note, Physician


Chief Complaint: 





Events noted


Feel better 


History of Present Illness: 





Patient was seen and examined. Awake and alert. Chart was reviewed


Lala catheter in place and s/p cystoscopy


Denies chest pain, SOB or palpitations





- Current Medication List


Current Medications: 


Active Medications





Acetaminophen (Tylenol -)  650 mg PO Q6H PRN


   PRN Reason: FEVER


   Last Admin: 07/14/18 09:11 Dose:  650 mg


Albuterol Sulfate (Ventolin 0.083% Nebulizer Soln -)  1 amp NEB Q1H PRN


   PRN Reason: SHORT OF BREATH/WHEEZING


Sodium Chloride (1/2 Normal Saline)  1,000 mls @ 125 mls/hr IV ASD[facility]


   Last Admin: 07/14/18 09:12 Dose:  125 mls/hr


Piperacillin Sod/Tazobactam (Sod 2.25 gm/ Dextrose)  50 mls @ 100 mls/hr IVPB 

Q8H-IV [facility]; Protocol


   Last Admin: 07/14/18 09:18 Dose:  100 mls/hr


Insulin Aspart (Novolog Vial Sliding Scale -)  1 vial SQ ACHS [facility]; Protocol


   Last Admin: 07/14/18 11:37 Dose:  8 units


Metoprolol Tartrate (Lopressor -)  25 mg PO BID [facility]


   Last Admin: 07/14/18 09:10 Dose:  25 mg











- Objective


Vital Signs: 


 Vital Signs











Temperature  97.9 F   07/14/18 08:08


 


Pulse Rate  90   07/14/18 08:08


 


Respiratory Rate  18   07/14/18 08:08


 


Blood Pressure  137/75   07/14/18 08:08


 


O2 Sat by Pulse Oximetry (%)  98   07/14/18 08:11











Neck: Yes: Supple


Cardiovascular: Yes: Regular Rate and Rhythm, S1, S2


Respiratory: Yes: CTA Bilaterally


Gastrointestinal: Yes: Normal Bowel Sounds, Soft.  No: Tenderness


Edema: No


Labs: 


 CBC, BMP





 07/12/18 06:20 





 07/14/18 06:55 





 








Problem List





- Problems


(1) Pedal edema


Code(s): R60.0 - LOCALIZED EDEMA   





(2) Acute renal failure (ARF)


Code(s): N17.9 - ACUTE KIDNEY FAILURE, UNSPECIFIED   


Qualifiers: 


   Acute renal failure type: unspecified   Qualified Code(s): N17.9 - Acute 

kidney failure, unspecified   





(3) Acute urinary retention


Code(s): R33.8 - OTHER RETENTION OF URINE   





(4) Hyperkalemia


Code(s): E87.5 - HYPERKALEMIA   





(5) HTN (hypertension)


Code(s): I10 - ESSENTIAL (PRIMARY) HYPERTENSION   


Qualifiers: 


   Hypertension type: essential hypertension   Qualified Code(s): I10 - 

Essential (primary) hypertension   





Assessment/Plan





1. Sinus tachycardia and hypertension improved


2. JANEL and hyperkalemia referable to obstructive uropathy with urinary 

retention and moderate bilateral hydronephrosis improving


3. Left pedal edema, etiology unclear 


4. Left leg rash rule out vasculitis, cellulitis





PLAN:


1.  input to follow regarding long term plan


2. Continue Lopressor 25 bid


3. Continue empiric antibiotics


4. Monitor electrolytes during post-obstructive diuresis including K level and 

renal function recovery


5. DVT prophylaxis





Further plans are to follow


Twan Alatorre MD

## 2018-07-14 NOTE — PN
Progress Note, Physician


History of Present Illness: 





Pt seen and examined at bedside. She is awake and alert. She feels much better. 

She is ambulating without difficulty. Urine is clear. 





- Current Medication List


Current Medications: 


Active Medications





Acetaminophen (Tylenol -)  650 mg PO Q6H PRN


   PRN Reason: FEVER


   Last Admin: 07/14/18 09:11 Dose:  650 mg


Albuterol Sulfate (Ventolin 0.083% Nebulizer Soln -)  1 amp NEB Q1H PRN


   PRN Reason: SHORT OF BREATH/WHEEZING


Sodium Chloride (1/2 Normal Saline)  1,000 mls @ 125 mls/hr IV [facility]


   Last Admin: 07/14/18 09:12 Dose:  125 mls/hr


Piperacillin Sod/Tazobactam (Sod 2.25 gm/ Dextrose)  50 mls @ 100 mls/hr IVPB 

Q8H-IV [facility]; Protocol


   Last Admin: 07/14/18 09:18 Dose:  100 mls/hr


Insulin Aspart (Novolog Vial Sliding Scale -)  1 vial SQ ACHS [facility]; Protocol


   Last Admin: 07/14/18 11:37 Dose:  8 units


Metoprolol Tartrate (Lopressor -)  25 mg PO BID [facility]


   Last Admin: 07/14/18 09:10 Dose:  25 mg











- Objective


Vital Signs: 


 Vital Signs











Temperature  97.9 F   07/14/18 08:08


 


Pulse Rate  90   07/14/18 08:08


 


Respiratory Rate  18   07/14/18 08:08


 


Blood Pressure  137/75   07/14/18 08:08


 


O2 Sat by Pulse Oximetry (%)  98   07/14/18 08:11











Constitutional: Yes: Calm


Eyes: Yes: Conjunctiva Clear


HENT: Yes: Atraumatic


Neck: Yes: Supple


Cardiovascular: Yes: S1, S2


Respiratory: Yes: Regular


Gastrointestinal: Yes: Normal Bowel Sounds, Soft


Genitourinary: Yes: WNL


Musculoskeletal: Yes: WNL


Edema: No


Neurological: Yes: Oriented


Psychiatric: Yes: Oriented


Labs: 


 CBC, BMP





 07/12/18 06:20 





 07/14/18 06:55 





 INR, PTT











INR  1.19  (0.82-1.09)  H  07/09/18  16:58    














Problem List





- Problems


(1) Acute renal failure (ARF)


Code(s): N17.9 - ACUTE KIDNEY FAILURE, UNSPECIFIED   


Qualifiers: 


   Acute renal failure type: unspecified   Qualified Code(s): N17.9 - Acute 

kidney failure, unspecified   





(2) Acute urinary retention


Code(s): R33.8 - OTHER RETENTION OF URINE   





(3) HTN (hypertension)


Code(s): I10 - ESSENTIAL (PRIMARY) HYPERTENSION   


Qualifiers: 


   Hypertension type: essential hypertension   Qualified Code(s): I10 - 

Essential (primary) hypertension   





(4) Hyperkalemia


Code(s): E87.5 - HYPERKALEMIA   





Assessment/Plan


 Current Medications











Generic Name Dose Route Start Last Admin





  Trade Name Freq  PRN Reason Stop Dose Admin


 


Acetaminophen  650 mg  07/13/18 14:34  07/14/18 09:11





  Tylenol -  PO   650 mg





  Q6H PRN   Administration





  FEVER   





     





     





     


 


Albuterol Sulfate  1 amp  07/13/18 14:34  





  Ventolin 0.083% Nebulizer Soln -  NEB   





  Q1H PRN   





  SHORT OF BREATH/WHEEZING   





     





     





     


 


Sodium Chloride  1,000 mls @ 125 mls/hr  07/13/18 14:34  07/14/18 09:12





  1/2 Normal Saline  IV   125 mls/hr





  ASDIR [facility]   Administration





     





     





     





     


 


Piperacillin Sod/Tazobactam  50 mls @ 100 mls/hr  07/13/18 18:00  07/14/18 09:18





  Sod 2.25 gm/ Dextrose  IVPB   100 mls/hr





  Q8H-IV [facility]   Administration





     





     





  Protocol   





     


 


Insulin Aspart  1 vial  07/13/18 16:30  07/14/18 11:37





  Novolog Vial Sliding Scale -  SQ   8 units





  ACHS [facility]   Administration





     





     





  Protocol   





     


 


Metoprolol Tartrate  25 mg  07/13/18 22:00  07/14/18 09:10





  Lopressor -  PO   25 mg





  BID [facility]   Administration





     





     





     





     








 Laboratory Tests











  07/11/18 07/11/18 07/11/18





  06:15 06:15 06:15


 


BROOKS M-Fox   Not observed 


 


GERONIMO Screen  Negative  


 


c-ANCA   <1:20 


 


Proteinase 3 (PR3)   <3.5 


 


p-ANCA   <1:20 


 


Atypical p-ANCA   <1:20 


 


Myeloperoxidase Ab   <9.0 


 


Double Strand DNA Ab    <1


 


Glomerular Base Memb Ab    3











Impression


1. JANEL


2. hyperkalemia


3. urinary retention


4. constipation


5. left leg edema


6. rash


7. DM


8. hx of hep b exposure





Plan


- renal function continues to improve


- JANEL likely from obstruction


- pt newly diagnosed diabetic


- follow up biopsy from cysto


- follow up skin biopsy


- will decrease rate of fluid


- will follow





Dr Fonseca

## 2018-07-15 LAB
ANION GAP SERPL CALC-SCNC: 9 MMOL/L (ref 8–16)
BUN SERPL-MCNC: 28 MG/DL (ref 7–18)
CALCIUM SERPL-MCNC: 8.3 MG/DL (ref 8.5–10.1)
CHLORIDE SERPL-SCNC: 107 MMOL/L (ref 98–107)
CO2 SERPL-SCNC: 25 MMOL/L (ref 21–32)
CREAT SERPL-MCNC: 1.9 MG/DL (ref 0.55–1.02)
GLUCOSE SERPL-MCNC: 83 MG/DL (ref 74–106)
MAGNESIUM SERPL-MCNC: 1.3 MG/DL (ref 1.8–2.4)
POTASSIUM SERPLBLD-SCNC: 4 MMOL/L (ref 3.5–5.1)
SODIUM SERPL-SCNC: 141 MMOL/L (ref 136–145)

## 2018-07-15 RX ADMIN — ACETAMINOPHEN PRN MG: 325 TABLET ORAL at 04:13

## 2018-07-15 RX ADMIN — PIPERACILLIN SODIUM AND TAZOBACTAM SODIUM SCH MLS/HR: .25; 2 INJECTION, POWDER, LYOPHILIZED, FOR SOLUTION INTRAVENOUS at 02:25

## 2018-07-15 RX ADMIN — INSULIN ASPART SCH UNITS: 100 INJECTION, SOLUTION INTRAVENOUS; SUBCUTANEOUS at 17:17

## 2018-07-15 RX ADMIN — INSULIN ASPART SCH UNITS: 100 INJECTION, SOLUTION INTRAVENOUS; SUBCUTANEOUS at 11:25

## 2018-07-15 RX ADMIN — SODIUM CHLORIDE SCH MLS/HR: 4.5 INJECTION, SOLUTION INTRAVENOUS at 04:20

## 2018-07-15 RX ADMIN — METOPROLOL TARTRATE SCH MG: 25 TABLET, FILM COATED ORAL at 09:30

## 2018-07-15 RX ADMIN — INSULIN ASPART SCH UNITS: 100 INJECTION, SOLUTION INTRAVENOUS; SUBCUTANEOUS at 21:49

## 2018-07-15 RX ADMIN — ACETAMINOPHEN PRN MG: 325 TABLET ORAL at 21:46

## 2018-07-15 RX ADMIN — ACETAMINOPHEN PRN MG: 325 TABLET ORAL at 12:21

## 2018-07-15 RX ADMIN — METOPROLOL TARTRATE SCH MG: 25 TABLET, FILM COATED ORAL at 21:46

## 2018-07-15 RX ADMIN — SODIUM CHLORIDE SCH MLS/HR: 4.5 INJECTION, SOLUTION INTRAVENOUS at 14:23

## 2018-07-15 RX ADMIN — INSULIN ASPART SCH UNITS: 100 INJECTION, SOLUTION INTRAVENOUS; SUBCUTANEOUS at 06:27

## 2018-07-15 NOTE — PN
Progress Note, Physician


Chief Complaint: 





Events noted


Feel better 


History of Present Illness: 





Patient was seen and examined. Awake and alert. Chart was reviewed


Lala catheter in place and s/p cystoscopy


Denies chest pain, SOB or palpitations





- Current Medication List


Current Medications: 


Active Medications





Acetaminophen (Tylenol -)  650 mg PO Q6H PRN


   PRN Reason: FEVER


   Last Admin: 07/15/18 04:13 Dose:  650 mg


Albuterol Sulfate (Ventolin 0.083% Nebulizer Soln -)  1 amp NEB Q1H PRN


   PRN Reason: SHORT OF BREATH/WHEEZING


Sodium Chloride (1/2 Normal Saline)  1,000 mls @ 100 mls/hr [facility]


   Last Admin: 07/15/18 04:20 Dose:  100 mls/hr


Insulin Aspart (Novolog Vial Sliding Scale -)  1 vial SQ [facility]; Protocol


   Last Admin: 07/15/18 06:27 Dose:  2 units


Metoprolol Tartrate (Lopressor -)  25 mg PO BID [facility]


   Last Admin: 07/15/18 09:30 Dose:  25 mg











- Objective


Vital Signs: 


 Vital Signs











Temperature  98.2 F   07/15/18 05:30


 


Pulse Rate  85   07/15/18 05:30


 


Respiratory Rate  16   07/15/18 05:30


 


Blood Pressure  125/73   07/15/18 05:30


 


O2 Sat by Pulse Oximetry (%)  95   07/14/18 21:00











Eyes: Yes: PERRL


HENT: Yes: Atraumatic


Neck: Yes: Supple


Cardiovascular: Yes: Regular Rate and Rhythm, S1, S2


Respiratory: Yes: CTA Bilaterally


Gastrointestinal: Yes: Normal Bowel Sounds, Soft.  No: Tenderness


Edema: No


Labs: 


 


                    07/15/18 07:50 





 








Problem List





- Problems


(1) Pedal edema


Code(s): R60.0 - LOCALIZED EDEMA   





(2) Acute renal failure (ARF)


Code(s): N17.9 - ACUTE KIDNEY FAILURE, UNSPECIFIED   


Qualifiers: 


   Acute renal failure type: unspecified   Qualified Code(s): N17.9 - Acute 

kidney failure, unspecified   





(3) Acute urinary retention


Code(s): R33.8 - OTHER RETENTION OF URINE   





(4) Hyperkalemia


Code(s): E87.5 - HYPERKALEMIA   





(5) HTN (hypertension)


Code(s): I10 - ESSENTIAL (PRIMARY) HYPERTENSION   


Qualifiers: 


   Hypertension type: essential hypertension   Qualified Code(s): I10 - 

Essential (primary) hypertension   





Assessment/Plan





1. Sinus tachycardia and hypertension improved


2. JANEL and hyperkalemia referable to obstructive uropathy with urinary 

retention and moderate bilateral hydronephrosis improving


3. Left pedal edema, etiology unclear 


4. Left leg rash rule out vasculitis, cellulitis





PLAN:


1.  input to follow regarding long term plan


2. Continue Lopressor 


3. Continue empiric antibiotics


4. Monitor electrolytes 


5. DVT prophylaxis





Further plans are to follow


Twan Alatorre MD

## 2018-07-15 NOTE — PN
Progress Note, Physician


History of Present Illness: 





patient stable


cellulitis has nearly resolved


mentions thjat she has some numbness in the legs when she lies down for a long 

time





- Current Medication List


Current Medications: 


Active Medications





Acetaminophen (Tylenol -)  650 mg PO Q6H PRN


   PRN Reason: FEVER


   Last Admin: 07/15/18 04:13 Dose:  650 mg


Albuterol Sulfate (Ventolin 0.083% Nebulizer Soln -)  1 amp NEB Q1H PRN


   PRN Reason: SHORT OF BREATH/WHEEZING


Sodium Chloride (1/2 Normal Saline)  1,000 mls @ 100 mls/hr IV ASDIR [facility]


   Last Admin: 07/15/18 04:20 Dose:  100 mls/hr


Insulin Aspart (Novolog Vial Sliding Scale -)  1 vial SQ ACHS [facility]; Protocol


   Last Admin: 07/15/18 06:27 Dose:  2 units


Metoprolol Tartrate (Lopressor -)  25 mg PO BID [facility]


   Last Admin: 07/14/18 21:25 Dose:  25 mg











- Objective


Vital Signs: 


 Vital Signs











Temperature  98.2 F   07/15/18 05:30


 


Pulse Rate  85   07/15/18 05:30


 


Respiratory Rate  16   07/15/18 05:30


 


Blood Pressure  125/73   07/15/18 05:30


 


O2 Sat by Pulse Oximetry (%)  95   07/14/18 21:00











Constitutional: Yes: No Distress, Calm


Cardiovascular: Yes: Regular Rate and Rhythm


Respiratory: Yes: Regular, CTA Bilaterally


Gastrointestinal: Yes: Normal Bowel Sounds, Soft


Genitourinary: Yes: Lala Present


Musculoskeletal: Yes: WNL


Extremities: Yes: WNL


Neurological: Yes: Alert, Oriented


Psychiatric: Yes: Alert, Oriented


Labs: 


 CBC, BMP





 07/12/18 06:20 





 INR, PTT











INR  1.19  (0.82-1.09)  H  07/09/18  16:58    














Assessment/Plan





Problem List





- Problems


(1) Acute urinary retention


Code(s): R33.8 - OTHER RETENTION OF URINE   





(2) HTN (hypertension)


Code(s): I10 - ESSENTIAL (PRIMARY) HYPERTENSION   


Qualifiers: 


   Hypertension type: essential hypertension   Qualified Code(s): I10 - 

Essential (primary) hypertension   





(3) Hyperkalemia


Code(s): E87.5 - HYPERKALEMIA   





(4) Pedal edema


Code(s): R60.0 - LOCALIZED EDEMA   





(5) Acute bilateral obstructive uropathy


Code(s): N13.9 - OBSTRUCTIVE AND REFLUX UROPATHY, UNSPECIFIED   





6 cellulitis of the left leg





plan


will stop iv abx


will switch to oral abx


rest continue current mgmt


and as per nephro





d/w pharmacy about the dosing of the abx

## 2018-07-15 NOTE — PN
Progress Note, Physician


History of Present Illness: 





Pt seen and examined at bedside. She is awake and alert. She denies shortness 

of breath. 





- Current Medication List


Current Medications: 


Active Medications





Acetaminophen (Tylenol -)  650 mg PO Q6H PRN


   PRN Reason: FEVER


   Last Admin: 07/15/18 12:21 Dose:  650 mg


Albuterol Sulfate (Ventolin 0.083% Nebulizer Soln -)  1 amp NEB Q1H PRN


   PRN Reason: SHORT OF BREATH/WHEEZING


Sodium Chloride (1/2 Normal Saline)  1,000 mls @ 100 mls/hr IV ASDIR [facility]


   Last Admin: 07/15/18 04:20 Dose:  100 mls/hr


Insulin Aspart (Novolog Vial Sliding Scale -)  1 vial SQ ACHS [facility]; Protocol


   Last Admin: 07/15/18 11:25 Dose:  4 units


Metoprolol Tartrate (Lopressor -)  25 mg PO BID [facility]


   Last Admin: 07/15/18 09:30 Dose:  25 mg











- Objective


Vital Signs: 


 Vital Signs











Temperature  98.2 F   07/15/18 05:30


 


Pulse Rate  85   07/15/18 05:30


 


Respiratory Rate  16   07/15/18 05:30


 


Blood Pressure  125/73   07/15/18 05:30


 


O2 Sat by Pulse Oximetry (%)  95   07/14/18 21:00











Constitutional: Yes: Calm


Eyes: Yes: Conjunctiva Clear


HENT: Yes: Atraumatic


Neck: Yes: Supple


Cardiovascular: Yes: S1, S2


Respiratory: Yes: CTA Bilaterally


Gastrointestinal: Yes: Normal Bowel Sounds, Soft


Genitourinary: Yes: Martin Present


Extremities: Yes: WNL


Edema: No


Neurological: Yes: Oriented


Psychiatric: Yes: Oriented


Labs: 


 CBC, BMP





 07/12/18 06:20 





 07/15/18 07:50 





 INR, PTT











INR  1.19  (0.82-1.09)  H  07/09/18  16:58    














Problem List





- Problems


(1) Acute renal failure (ARF)


Code(s): N17.9 - ACUTE KIDNEY FAILURE, UNSPECIFIED   


Qualifiers: 


   Acute renal failure type: unspecified   Qualified Code(s): N17.9 - Acute 

kidney failure, unspecified   





(2) Acute urinary retention


Code(s): R33.8 - OTHER RETENTION OF URINE   





(3) HTN (hypertension)


Code(s): I10 - ESSENTIAL (PRIMARY) HYPERTENSION   


Qualifiers: 


   Hypertension type: essential hypertension   Qualified Code(s): I10 - 

Essential (primary) hypertension   





(4) Hyperkalemia


Code(s): E87.5 - HYPERKALEMIA   





Assessment/Plan


 Current Medications











Generic Name Dose Route Start Last Admin





  Trade Name Freq  PRN Reason Stop Dose Admin


 


Acetaminophen  650 mg  07/13/18 14:34  07/15/18 12:21





  Tylenol -  PO   650 mg





  Q6H PRN   Administration





  FEVER   





     





     





     


 


Albuterol Sulfate  1 amp  07/13/18 14:34  





  Ventolin 0.083% Nebulizer Soln -  NEB   





  Q1H PRN   





  SHORT OF BREATH/WHEEZING   





     





     





     


 


Sodium Chloride  1,000 mls @ 100 mls/hr  07/14/18 12:17  07/15/18 04:20





  1/2 Normal Saline  IV   100 mls/hr





  ASDIR [facility]   Administration





     





     





     





     


 


Insulin Aspart  1 vial  07/13/18 16:30  07/15/18 11:25





  Novolog Vial Sliding Scale -  SQ   4 units





  ACHS [facility]   Administration





     





     





  Protocol   





     


 


Metoprolol Tartrate  25 mg  07/13/18 22:00  07/15/18 09:30





  Lopressor -  PO   25 mg





  BID [facility]   Administration





     





     





     





     











Impression


1. JANEL


2. hyperkalemia


3. urinary retention


4. constipation


5. left leg edema


6. rash


7. DM


8. hx of hep b exposure





Plan


- renal function is improving


- decrease fluids to 75 cc


- repeat labs in am


- urology follow up for plan for martin catheter


- follow up biopsy from cysto


- follow up skin biopsy


- will follow





Dr Fonseca

## 2018-07-15 NOTE — PN
Progress Note, Physician


History of Present Illness: 


doing well





- Current Medication List


Current Medications: 


Active Medications





Acetaminophen (Tylenol -)  650 mg PO Q6H PRN


   PRN Reason: FEVER


   Last Admin: 07/15/18 12:21 Dose:  650 mg


Albuterol Sulfate (Ventolin 0.083% Nebulizer Soln -)  1 amp NEB Q1H PRN


   PRN Reason: SHORT OF BREATH/WHEEZING


Sodium Chloride (1/2 Normal Saline)  1,000 mls @ 75 mls/hr IV ASDIR [facility]


   Last Admin: 07/15/18 14:23 Dose:  75 mls/hr


Insulin Aspart (Novolog Vial Sliding Scale -)  1 vial SQ ACHS [facility]; Protocol


   Last Admin: 07/15/18 17:17 Dose:  2 units


Metoprolol Tartrate (Lopressor -)  25 mg PO BID [facility]


   Last Admin: 07/15/18 09:30 Dose:  25 mg











- Objective


Vital Signs: 


 Vital Signs











Temperature  98.2 F   07/15/18 14:13


 


Pulse Rate  90   07/15/18 14:13


 


Respiratory Rate  20   07/15/18 14:13


 


Blood Pressure  140/89   07/15/18 14:13


 


O2 Sat by Pulse Oximetry (%)  95   07/14/18 21:00











Constitutional: Yes: No Distress


HENT: Yes: Atraumatic


Neck: Yes: Supple


Cardiovascular: Yes: Regular Rate and Rhythm


Respiratory: Yes: CTA Bilaterally


Gastrointestinal: Yes: Normal Bowel Sounds


Extremities: Yes: WNL


Neurological: Yes: Alert, Oriented


Labs: 


 CBC, BMP





 07/12/18 06:20 





 07/15/18 07:50 





 INR, PTT











INR  1.19  (0.82-1.09)  H  07/09/18  16:58    














Problem List





- Problems


(1) Acute renal failure (ARF)


Assessment/Plan: 


urine out puts are clear


cr improving


clinically much better 


awaiting urology input


dc martin??


Code(s): N17.9 - ACUTE KIDNEY FAILURE, UNSPECIFIED   


Qualifiers: 


   Acute renal failure type: unspecified   Qualified Code(s): N17.9 - Acute 

kidney failure, unspecified   





(2) Acute urinary retention


Assessment/Plan: 


martin in place


Code(s): R33.8 - OTHER RETENTION OF URINE   





(3) HTN (hypertension)


Assessment/Plan: 


on meds 


monitor





Code(s): I10 - ESSENTIAL (PRIMARY) HYPERTENSION   


Qualifiers: 


   Hypertension type: essential hypertension   Qualified Code(s): I10 - 

Essential (primary) hypertension   





(4) Hyperkalemia


Assessment/Plan: 


resolved


Code(s): E87.5 - HYPERKALEMIA   





(5) Pedal edema


Code(s): R60.0 - LOCALIZED EDEMA   





(6) Acute bilateral obstructive uropathy


Code(s): N13.9 - OBSTRUCTIVE AND REFLUX UROPATHY, UNSPECIFIED   





(7) Rash and nonspecific skin eruption


Assessment/Plan: 


derm consult..reviewed


cellulitis


start abx


biopsy done by derm


Code(s): R21 - RASH AND OTHER NONSPECIFIC SKIN ERUPTION

## 2018-07-16 LAB
ALBUMIN SERPL-MCNC: 3 G/DL (ref 3.4–5)
ALP SERPL-CCNC: 206 U/L (ref 45–117)
ALT SERPL-CCNC: 35 U/L (ref 12–78)
ANION GAP SERPL CALC-SCNC: 9 MMOL/L (ref 8–16)
AST SERPL-CCNC: 30 U/L (ref 15–37)
BASOPHILS # BLD: 0.5 % (ref 0–2)
BILIRUB SERPL-MCNC: 0.3 MG/DL (ref 0.2–1)
BUN SERPL-MCNC: 24 MG/DL (ref 7–18)
CALCIUM SERPL-MCNC: 7.9 MG/DL (ref 8.5–10.1)
CHLORIDE SERPL-SCNC: 108 MMOL/L (ref 98–107)
CO2 SERPL-SCNC: 26 MMOL/L (ref 21–32)
CREAT SERPL-MCNC: 1.7 MG/DL (ref 0.55–1.02)
CREAT SERPL-MCNC: 1.7 MG/DL (ref 0.55–1.02)
DEPRECATED RDW RBC AUTO: 12.3 % (ref 11.6–15.6)
EOSINOPHIL # BLD: 3.5 % (ref 0–4.5)
GLUCOSE SERPL-MCNC: 98 MG/DL (ref 74–106)
HCT VFR BLD CALC: 26.4 % (ref 32.4–45.2)
HGB BLD-MCNC: 9.1 GM/DL (ref 10.7–15.3)
LYMPHOCYTES # BLD: 30.8 % (ref 8–40)
MCH RBC QN AUTO: 30 PG (ref 25.7–33.7)
MCHC RBC AUTO-ENTMCNC: 34.5 G/DL (ref 32–36)
MCV RBC: 86.8 FL (ref 80–96)
MONOCYTES # BLD AUTO: 7.9 % (ref 3.8–10.2)
NEUTROPHILS # BLD: 57.3 % (ref 42.8–82.8)
PLATELET # BLD AUTO: 379 K/MM3 (ref 134–434)
PMV BLD: 7.9 FL (ref 7.5–11.1)
POTASSIUM SERPLBLD-SCNC: 4 MMOL/L (ref 3.5–5.1)
PROT SERPL-MCNC: 6.8 G/DL (ref 6.4–8.2)
RBC # BLD AUTO: 3.04 M/MM3 (ref 3.6–5.2)
SODIUM SERPL-SCNC: 141 MMOL/L (ref 136–145)
WBC # BLD AUTO: 7.8 K/MM3 (ref 4–10)

## 2018-07-16 RX ADMIN — INSULIN ASPART SCH UNITS: 100 INJECTION, SOLUTION INTRAVENOUS; SUBCUTANEOUS at 06:28

## 2018-07-16 RX ADMIN — TAMSULOSIN HYDROCHLORIDE SCH MG: 0.4 CAPSULE ORAL at 14:31

## 2018-07-16 RX ADMIN — BETHANECHOL CHLORIDE SCH MG: 25 TABLET ORAL at 15:48

## 2018-07-16 RX ADMIN — SODIUM CHLORIDE SCH MLS/HR: 4.5 INJECTION, SOLUTION INTRAVENOUS at 05:44

## 2018-07-16 RX ADMIN — INSULIN ASPART SCH UNITS: 100 INJECTION, SOLUTION INTRAVENOUS; SUBCUTANEOUS at 17:18

## 2018-07-16 RX ADMIN — ACETAMINOPHEN PRN MG: 325 TABLET ORAL at 06:28

## 2018-07-16 RX ADMIN — ACETAMINOPHEN PRN MG: 325 TABLET ORAL at 22:18

## 2018-07-16 RX ADMIN — INSULIN ASPART SCH UNITS: 100 INJECTION, SOLUTION INTRAVENOUS; SUBCUTANEOUS at 12:04

## 2018-07-16 RX ADMIN — ACETAMINOPHEN PRN MG: 325 TABLET ORAL at 13:52

## 2018-07-16 RX ADMIN — INSULIN ASPART SCH UNITS: 100 INJECTION, SOLUTION INTRAVENOUS; SUBCUTANEOUS at 22:19

## 2018-07-16 RX ADMIN — BETHANECHOL CHLORIDE SCH MG: 25 TABLET ORAL at 22:18

## 2018-07-16 RX ADMIN — METOPROLOL TARTRATE SCH MG: 25 TABLET, FILM COATED ORAL at 10:16

## 2018-07-16 RX ADMIN — AMOXICILLIN AND CLAVULANATE POTASSIUM SCH TAB: 500; 125 TABLET, FILM COATED ORAL at 17:18

## 2018-07-16 RX ADMIN — METOPROLOL TARTRATE SCH MG: 25 TABLET, FILM COATED ORAL at 22:21

## 2018-07-16 NOTE — PN
Progress Note, Physician


History of Present Illness: 


Urine clear, martin d/shukri and undergoing voiding trial.








- Current Medication List


Current Medications: 


Active Medications





Acetaminophen (Tylenol -)  650 mg PO Q6H PRN


   PRN Reason: FEVER


   Last Admin: 07/16/18 06:28 Dose:  650 mg


Albuterol Sulfate (Ventolin 0.083% Nebulizer Soln -)  1 amp NEB Q1H PRN


   PRN Reason: SHORT OF BREATH/WHEEZING


Sodium Chloride (1/2 Normal Saline)  1,000 mls @ 55 mls/hr IV ASDIR [facility]


Insulin Aspart (Novolog Vial Sliding Scale -)  1 vial SQ ACHS [facility]; Protocol


   Last Admin: 07/16/18 06:28 Dose:  2 units


Metoprolol Tartrate (Lopressor -)  25 mg PO BID [facility]


   Last Admin: 07/16/18 10:16 Dose:  25 mg











- Objective


Vital Signs: 


 Vital Signs











Temperature  98.9 F   07/16/18 08:15


 


Pulse Rate  89   07/16/18 08:15


 


Respiratory Rate  20   07/16/18 08:15


 


Blood Pressure  136/80   07/16/18 08:15


 


O2 Sat by Pulse Oximetry (%)  98   07/15/18 21:00











Constitutional: Yes: No Distress, Calm


Neck: Yes: Supple


Cardiovascular: Yes: Regular Rate and Rhythm


Respiratory: Yes: Regular, CTA Bilaterally


Gastrointestinal: Yes: Normal Bowel Sounds, Soft, Abdomen, Obese


Edema: No


Labs: 


 CBC, BMP





 07/16/18 06:20 





 07/16/18 06:20 





 INR, PTT











INR  1.19  (0.82-1.09)  H  07/09/18  16:58    














Problem List





- Problems


(1) Acute urinary retention


Code(s): R33.8 - OTHER RETENTION OF URINE   





(2) HTN (hypertension)


Code(s): I10 - ESSENTIAL (PRIMARY) HYPERTENSION   


Qualifiers: 


   Hypertension type: essential hypertension   Qualified Code(s): I10 - 

Essential (primary) hypertension   





(3) Hyperkalemia


Code(s): E87.5 - HYPERKALEMIA   





(4) Pedal edema


Code(s): R60.0 - LOCALIZED EDEMA   





(5) Acute bilateral obstructive uropathy


Code(s): N13.9 - OBSTRUCTIVE AND REFLUX UROPATHY, UNSPECIFIED   





Assessment/Plan


Transthoracic echocardiography to assess LV/RV and valvular function





1. Sinus tachycardia and hypertension improved


2. Acute on CKD and hyperkalemia referable to obstructive uropathy with urinary 

retention and moderate bilateral hydronephrosis improving


3. Left pedal edema, etiology unclear 


4. Left leg rash rule out vasculitis, cellulitis





PLAN:


1. Undergoing voiding trial


2. Continue Lopressor 25 bid


3. Continue empiric antibiotics course


4. Monitor electrolytes, f/u bladder and skin biopsies


5. DVT prophylaxis

## 2018-07-16 NOTE — CONSULT
Consult


Consult Specialty:: endocrine


Referred by:: 


Reason for Consultation:: diabetes mellitus ckd





- History of Present Illness


Chief Complaint: high sugars


History of Present Illness: 


53-year-old female, denies any significant past medical history, post menopausal

, here with left leg pain and swelling.  Patient reports that she was in her 

usual state of health up until several weeks ago when she initially developed 

constipationand abdominal discomfort has ? history of dm,non compliant with 

follow up,and meds,has elevated blood sugars and hb a1c 14%








- History Source


History Provided By: Patient





- Past Medical History


...LMP Comment: OVER 1 YEAR AGO


...Pregnant: No





- Alcohol/Substance Use


Hx Alcohol Use: No


History of Substance Use: reports: None





- Smoking History


Smoking history: Never smoked


Have you smoked in the past 12 months: No





Home Medications





- Allergies


Allergies/Adverse Reactions: 


 Allergies











Allergy/AdvReac Type Severity Reaction Status Date / Time


 


No Known Allergies Allergy   Verified 07/09/18 15:16














- Home Medications


Home Medications: 


Ambulatory Orders





NK [No Known Home Medication]  07/09/18 











Family Disease History





- Family Disease History


Family Disease History: CA: Mother (breast CA)





Review of Systems





- Review of Systems


Constitutional: reports: Lethargy


Eyes: reports: No Symptoms


HENT: reports: No Symptoms


Neck: reports: No Symptoms


Cardiovascular: reports: No Symptoms


Respiratory: reports: No Symptoms


Gastrointestinal: reports: Bloating, Constipation


Genitourinary: reports: Frequency


Breasts: reports: No Symptoms Reported


Musculoskeletal: reports: No Symptoms


Integumentary: reports: No Symptoms


Neurological: reports: No Symptoms





Physical Exam


Vital Signs: 


 Vital Signs











Temperature  99 F   07/16/18 16:00


 


Pulse Rate  929 H  07/16/18 16:00


 


Respiratory Rate  20   07/16/18 16:00


 


Blood Pressure  125/83   07/16/18 16:00


 


O2 Sat by Pulse Oximetry (%)  99   07/16/18 09:00











Constitutional: Yes: Calm


Eyes: Yes: EOM Intact


HENT: Yes: Normocephalic


Neck: Yes: Trachea Midline


Cardiovascular: Yes: Regular Rate and Rhythm


Respiratory: Yes: CTA Bilaterally


Gastrointestinal: Yes: Normal Bowel Sounds


...Rectal Exam: Yes: Deferred


Renal/: Yes: WNL


Musculoskeletal: Yes: WNL


Neurological: Yes: Alert, Oriented


Labs: 


 CBC, BMP





 07/16/18 06:20 





 07/16/18 06:20 











Problem List





- Problems


(1) Diabetes mellitus with hyperosmolarity


Code(s): E11.00 - TYPE 2 DIAB W HYPROSM W/O NONKET HYPRGLY-HYPROS COMA ([facility]) 

  





(2) Hyperkalemia


Code(s): E87.5 - HYPERKALEMIA   





Assessment/Plan





Current Active Problems





Acute bilateral obstructive uropathy (Acute)


Acute renal failure (ARF) (Acute)


Acute urinary retention (Acute)


HTN (hypertension) (Acute)


Hyperkalemia (Acute)


Pedal edema (Acute)


Rash and nonspecific skin eruption (Acute)


diabetes mellitus/nephropathy


 Abnormal Lab Results











  07/16/18 07/16/18 07/16/18





  06:20 06:20 06:20


 


RBC   3.04 L 


 


Hgb   9.1 L 


 


Hct   26.4 L 


 


Chloride    108 H


 


BUN  25 H   24 H


 


Creatinine  1.7 H   1.7 H


 


Calcium  8.2 L   7.9 L


 


Alkaline Phosphatase    206 H D


 


Albumin    3.0 L








 Laboratory Results - last 24 hr











  07/13/18 07/15/18 07/16/18





  15:00 21:45 05:58


 


WBC   


 


RBC   


 


Hgb   


 


Hct   


 


MCV   


 


MCH   


 


MCHC   


 


RDW   


 


Plt Count   


 


MPV   


 


Absolute Neuts (auto)   


 


Neutrophils %   


 


Lymphocytes %   


 


Monocytes %   


 


Eosinophils %   


 


Basophils %   


 


Nucleated RBC %   


 


Sodium   


 


Potassium   


 


Chloride   


 


Carbon Dioxide   


 


Anion Gap   


 


BUN   


 


Creatinine   


 


Creat Clearance w eGFR   


 


POC Glucometer  87  158  101


 


Random Glucose   


 


Calcium   


 


Total Bilirubin   


 


AST   


 


ALT   


 


Alkaline Phosphatase   


 


Total Protein   


 


Albumin   














  07/16/18 07/16/18 07/16/18





  06:20 06:20 06:20


 


WBC   7.8 


 


RBC   3.04 L 


 


Hgb   9.1 L 


 


Hct   26.4 L 


 


MCV   86.8 


 


MCH   30.0 


 


MCHC   34.5 


 


RDW   12.3 


 


Plt Count   379  D 


 


MPV   7.9 


 


Absolute Neuts (auto)   4.4 


 


Neutrophils %   57.3  D 


 


Lymphocytes %   30.8  D 


 


Monocytes %   7.9 


 


Eosinophils %   3.5  D 


 


Basophils %   0.5 


 


Nucleated RBC %   0 


 


Sodium  141   142


 


Potassium  4.1   4.0


 


Chloride  106   108 H


 


Carbon Dioxide  26   25


 


Anion Gap  9   9


 


BUN  25 H   24 H


 


Creatinine  1.7 H   1.7 H


 


Creat Clearance w eGFR  31.20   31.20


 


POC Glucometer   


 


Random Glucose  98   94


 


Calcium  8.2 L   7.9 L


 


Total Bilirubin    0.3


 


AST    30


 


ALT    35


 


Alkaline Phosphatase    206 H D


 


Total Protein    6.8


 


Albumin    3.0 L














  07/16/18 07/16/18





  11:33 17:09


 


WBC  


 


RBC  


 


Hgb  


 


Hct  


 


MCV  


 


MCH  


 


MCHC  


 


RDW  


 


Plt Count  


 


MPV  


 


Absolute Neuts (auto)  


 


Neutrophils %  


 


Lymphocytes %  


 


Monocytes %  


 


Eosinophils %  


 


Basophils %  


 


Nucleated RBC %  


 


Sodium  


 


Potassium  


 


Chloride  


 


Carbon Dioxide  


 


Anion Gap  


 


BUN  


 


Creatinine  


 


Creat Clearance w eGFR  


 


POC Glucometer  189  159


 


Random Glucose  


 


Calcium  


 


Total Bilirubin  


 


AST  


 


ALT  


 


Alkaline Phosphatase  


 


Total Protein  


 


Albumin  








plan:


bgm qid novolog insulin


\start glimiperide 2mg daily


januvia 50mg daily


diet nutrition 


follow up as outpatient

## 2018-07-16 NOTE — PN
Progress Note, Physician


History of Present Illness: 





Pt seen and examined at bedside. She is awake and alert. She is on a voiding 

trial. 





- Current Medication List


Current Medications: 


Active Medications





Acetaminophen (Tylenol -)  650 mg PO Q6H PRN


   PRN Reason: FEVER


   Last Admin: 07/16/18 06:28 Dose:  650 mg


Albuterol Sulfate (Ventolin 0.083% Nebulizer Soln -)  1 amp NEB Q1H PRN


   PRN Reason: SHORT OF BREATH/WHEEZING


Sodium Chloride (1/2 Normal Saline)  1,000 mls @ 75 mls/hr IV ASDIR [facility]


   Last Admin: 07/16/18 05:44 Dose:  75 mls/hr


Insulin Aspart (Novolog Vial Sliding Scale -)  1 vial SQ ACHS [facility]; Protocol


   Last Admin: 07/16/18 06:28 Dose:  2 units


Metoprolol Tartrate (Lopressor -)  25 mg PO BID [facility]


   Last Admin: 07/16/18 10:16 Dose:  25 mg











- Objective


Vital Signs: 


 Vital Signs











Temperature  98.9 F   07/16/18 08:15


 


Pulse Rate  89   07/16/18 08:15


 


Respiratory Rate  20   07/16/18 08:15


 


Blood Pressure  136/80   07/16/18 08:15


 


O2 Sat by Pulse Oximetry (%)  98   07/15/18 21:00











Constitutional: Yes: Calm


Eyes: Yes: Conjunctiva Clear


HENT: Yes: Atraumatic


Neck: Yes: Supple


Cardiovascular: Yes: S1, S2


Respiratory: Yes: CTA Bilaterally


Gastrointestinal: Yes: Normal Bowel Sounds, Soft, Abdomen, Obese


Genitourinary: Yes: WNL


Musculoskeletal: Yes: WNL


Edema: No


Neurological: Yes: Oriented


Psychiatric: Yes: Oriented


Labs: 


 CBC, BMP





 07/16/18 06:20 





 07/16/18 06:20 





 INR, PTT











INR  1.19  (0.82-1.09)  H  07/09/18  16:58    














Problem List





- Problems


(1) Acute renal failure (ARF)


Code(s): N17.9 - ACUTE KIDNEY FAILURE, UNSPECIFIED   


Qualifiers: 


   Acute renal failure type: unspecified   Qualified Code(s): N17.9 - Acute 

kidney failure, unspecified   





(2) Acute urinary retention


Code(s): R33.8 - OTHER RETENTION OF URINE   





(3) HTN (hypertension)


Code(s): I10 - ESSENTIAL (PRIMARY) HYPERTENSION   


Qualifiers: 


   Hypertension type: essential hypertension   Qualified Code(s): I10 - 

Essential (primary) hypertension   





(4) Hyperkalemia


Code(s): E87.5 - HYPERKALEMIA   





Assessment/Plan


 Current Medications











Generic Name Dose Route Start Last Admin





  Trade Name Freq  PRN Reason Stop Dose Admin


 


Acetaminophen  650 mg  07/13/18 14:34  07/16/18 06:28





  Tylenol -  PO   650 mg





  Q6H PRN   Administration





  FEVER   





     





     





     


 


Albuterol Sulfate  1 amp  07/13/18 14:34  





  Ventolin 0.083% Nebulizer Soln -  NEB   





  Q1H PRN   





  SHORT OF BREATH/WHEEZING   





     





     





     


 


Sodium Chloride  1,000 mls @ 75 mls/hr  07/15/18 13:59  07/16/18 05:44





  1/2 Normal Saline  IV   75 mls/hr





  AS[facility]   Administration





     





     





     





     


 


Insulin Aspart  1 vial  07/13/18 16:30  07/16/18 06:28





  Novolog Vial Sliding Scale -  SQ   2 units





  ACHS [facility]   Administration





     





     





  Protocol   





     


 


Metoprolol Tartrate  25 mg  07/13/18 22:00  07/16/18 10:16





  Lopressor -  PO   25 mg





  BID [facility]   Administration





     





     





     





     











Impression


1. JANEL


2. hyperkalemia


3. urinary retention


4. constipation


5. left leg edema


6. rash


7. DM


8. hx of hep b exposure





Plan


- renal function is stabilizing


- pt getting a voiding trial


- urology input appreciated


- follow up biopsy from cysto


- follow up skin biopsy


- will follow





Dr Fonseca

## 2018-07-16 NOTE — PN
Progress Note, Physician


History of Present Illness: 





patient doing well


no complaints


passed urine


getting a trial of voiding


leg looks good





- Current Medication List


Current Medications: 


Active Medications





Acetaminophen (Tylenol -)  650 mg PO Q6H PRN


   PRN Reason: FEVER


   Last Admin: 07/16/18 06:28 Dose:  650 mg


Albuterol Sulfate (Ventolin 0.083% Nebulizer Soln -)  1 amp NEB Q1H PRN


   PRN Reason: SHORT OF BREATH/WHEEZING


Sodium Chloride (1/2 Normal Saline)  1,000 mls @ 55 mls/hr IV ASDIR [facility]


   Last Admin: 07/16/18 12:07 Dose:  55 mls/hr


Insulin Aspart (Novolog Vial Sliding Scale -)  1 vial SQ ACHS [facility]; Protocol


   Last Admin: 07/16/18 12:04 Dose:  4 units


Metoprolol Tartrate (Lopressor -)  25 mg PO BID [facility]


   Last Admin: 07/16/18 10:16 Dose:  25 mg











- Objective


Vital Signs: 


 Vital Signs











Temperature  98.9 F   07/16/18 08:15


 


Pulse Rate  89   07/16/18 08:15


 


Respiratory Rate  20   07/16/18 08:15


 


Blood Pressure  136/80   07/16/18 08:15


 


O2 Sat by Pulse Oximetry (%)  98   07/15/18 21:00











Constitutional: Yes: No Distress, Calm


Cardiovascular: Yes: Regular Rate and Rhythm


Respiratory: Yes: Regular, CTA Bilaterally


Gastrointestinal: Yes: Normal Bowel Sounds, Soft


Musculoskeletal: Yes: WNL


Extremities: Yes: WNL


Neurological: Yes: Alert, Oriented


Psychiatric: Yes: Alert, Oriented


Labs: 


 CBC, BMP





 07/16/18 06:20 





 07/16/18 06:20 





 INR, PTT











INR  1.19  (0.82-1.09)  H  07/09/18  16:58    














Assessment/Plan





Problem List





- Problems


(1) Acute urinary retention


Code(s): R33.8 - OTHER RETENTION OF URINE   





(2) HTN (hypertension)


Code(s): I10 - ESSENTIAL (PRIMARY) HYPERTENSION   


Qualifiers: 


   Hypertension type: essential hypertension   Qualified Code(s): I10 - 

Essential (primary) hypertension   





(3) Hyperkalemia


Code(s): E87.5 - HYPERKALEMIA   





(4) Pedal edema


Code(s): R60.0 - LOCALIZED EDEMA   





(5) Acute bilateral obstructive uropathy


Code(s): N13.9 - OBSTRUCTIVE AND REFLUX UROPATHY, UNSPECIFIED   





6 cellulitis of the left leg





plan


continue current mgmt


stop abx today


rest continue current mgmt


patient stable


watch for urinary issues

## 2018-07-16 NOTE — PN
Progress Note (short form)





- Note


Progress Note: 





urine clear


creat 1.9 yesterday


d/c martin for voiding trial

## 2018-07-16 NOTE — PATH
Surgical Pathology Report



Patient Name:  LUIS GANN

Accession #:  [id number]

Med. Rec. #:  [id number]                                                        

   /Age/Gender:  1965 (Age: 53) / F

Account:  [id number]                                                          

             Location: [address] [place] [address]/[facility]

Taken:  2018

Received:  2018

Reported:  2018

Physicians:  THANIA Gonsalves M.D.

  



Specimen(s) Received

 SKIN BIOPSY LEFT LEG 





Clinical History

Leg pustules and macules, L leg

Postoperative diagnosis: Rule out vasculitis







Final Diagnosis

SKIN, LEG, LEFT, SHAVE BIOPSY:

INTRAEPIDERMAL SPONGIOSIS, ACUTE INFLAMMATION, AND ASSOCIATED ULCERATION.

DERMAL EDEMA, NEUTROPHILIC INFILTRATION OF BLOOD VESSELS, LEUKOCYTOCLASIS, AND

FIBRINOID NECROSIS, CONSISTENT WITH LEUKOCYTOCLASTIC VASCULITIS WITH SECONDARY

ULCERATION.

FUNGUS AND ACID FAST BACILLI ([employer] AND [employer]) SPECIAL STAINS ARE NEGATIVE.



Comment: Possible etiologies include infection, drugs, malignancy, and systemic

disease. Suggest clinical and immunofluorescence studies correlation.







***Electronically Signed***

Yary Cardona M.D.





Gross Description

Received in formalin, labeled with the patient's name and indicated on the

requisition to be a skin biopsy from the left leg, is a 0.7 x 0.5 cm unoriented,

tan skin shave. The base is inked green and the specimen is bisected and

entirely submitted in one cassette.

/2018



saudi/[email or web address]

## 2018-07-16 NOTE — PN
Progress Note, Physician


History of Present Illness: 


doing well





- Current Medication List


Current Medications: 


Active Medications





Acetaminophen (Tylenol -)  650 mg PO Q6H PRN


   PRN Reason: FEVER


   Last Admin: 07/16/18 13:52 Dose:  650 mg


Albuterol Sulfate (Ventolin 0.083% Nebulizer Soln -)  1 amp NEB Q1H PRN


   PRN Reason: SHORT OF BREATH/WHEEZING


Amoxicillin/Clavulanate Potassium (Augmentin - 500mg Tablet)  1 tab PO BID@0800,

[facility]


   Last Admin: 07/16/18 17:18 Dose:  1 tab


Bethanechol Chloride (Urecholine -)  50 mg PO TID [facility]


   Last Admin: 07/16/18 15:48 Dose:  50 mg


Glimepiride (Amaryl -)  2 mg PO DAILY@07[facility]


Sodium Chloride (1/2 Normal Saline)  1,000 mls @ 55 mls/hr IV [facility]


   Last Admin: 07/16/18 12:07 Dose:  55 mls/hr


Insulin Aspart (Novolog Vial Sliding Scale -)  1 vial SQ [facility]CHS [facility]; Protocol


   Last Admin: 07/16/18 17:18 Dose:  4 units


Metoprolol Tartrate (Lopressor -)  25 mg PO BID [facility]


   Last Admin: 07/16/18 10:16 Dose:  25 mg


Sitagliptin Phosphate (Januvia -)  50 mg PO DAILY@0700 [facility]


Tamsulosin HCl (Flomax -)  0.4 mg PO DAILY@0830 [facility]


   Last Admin: 07/16/18 14:31 Dose:  0.4 mg











- Objective


Vital Signs: 


 Vital Signs











Temperature  99 F   07/16/18 16:00


 


Pulse Rate  929 H  07/16/18 16:00


 


Respiratory Rate  20   07/16/18 16:00


 


Blood Pressure  125/83   07/16/18 16:00


 


O2 Sat by Pulse Oximetry (%)  99   07/16/18 09:00











Constitutional: Yes: No Distress


HENT: Yes: Atraumatic


Neck: Yes: Supple


Cardiovascular: Yes: Regular Rate and Rhythm


Respiratory: Yes: CTA Bilaterally


Gastrointestinal: Yes: Normal Bowel Sounds


Extremities: Yes: WNL


Edema: LLE: Trace, RLE: Trace


Neurological: Yes: Alert, Oriented


Labs: 


 CBC, BMP





 07/16/18 06:20 





 07/16/18 06:20 





 INR, PTT











INR  1.19  (0.82-1.09)  H  07/09/18  16:58    














Problem List





- Problems


(1) Acute renal failure (ARF)


Assessment/Plan: 


martin dc today


trial of voiding failed so martin was put back in


on flomax and urocholine


Code(s): N17.9 - ACUTE KIDNEY FAILURE, UNSPECIFIED   


Qualifiers: 


   Acute renal failure type: unspecified   Qualified Code(s): N17.9 - Acute 

kidney failure, unspecified   





(2) Acute urinary retention


Assessment/Plan: 


martin dc


Code(s): R33.8 - OTHER RETENTION OF URINE   





(3) HTN (hypertension)


Assessment/Plan: 


on meds 


monitor





Code(s): I10 - ESSENTIAL (PRIMARY) HYPERTENSION   


Qualifiers: 


   Hypertension type: essential hypertension   Qualified Code(s): I10 - 

Essential (primary) hypertension   





(4) Hyperkalemia


Assessment/Plan: 


resolved


Code(s): E87.5 - HYPERKALEMIA   





(5) Pedal edema


Code(s): R60.0 - LOCALIZED EDEMA   





(6) Acute bilateral obstructive uropathy


Code(s): N13.9 - OBSTRUCTIVE AND REFLUX UROPATHY, UNSPECIFIED   





(7) Rash and nonspecific skin eruption


Assessment/Plan: 


derm consult..reviewed


cellulitis


start abx


biopsy done by derm


Code(s): R21 - RASH AND OTHER NONSPECIFIC SKIN ERUPTION

## 2018-07-17 LAB
ANION GAP SERPL CALC-SCNC: 11 MMOL/L (ref 8–16)
BUN SERPL-MCNC: 23 MG/DL (ref 7–18)
CALCIUM SERPL-MCNC: 8 MG/DL (ref 8.5–10.1)
CHLORIDE SERPL-SCNC: 107 MMOL/L (ref 98–107)
CO2 SERPL-SCNC: 25 MMOL/L (ref 21–32)
CREAT SERPL-MCNC: 1.4 MG/DL (ref 0.55–1.02)
GLUCOSE SERPL-MCNC: 104 MG/DL (ref 74–106)
POTASSIUM SERPLBLD-SCNC: 3.9 MMOL/L (ref 3.5–5.1)
SODIUM SERPL-SCNC: 143 MMOL/L (ref 136–145)

## 2018-07-17 RX ADMIN — BETHANECHOL CHLORIDE SCH MG: 25 TABLET ORAL at 22:48

## 2018-07-17 RX ADMIN — AMOXICILLIN AND CLAVULANATE POTASSIUM SCH TAB: 500; 125 TABLET, FILM COATED ORAL at 08:11

## 2018-07-17 RX ADMIN — AMOXICILLIN AND CLAVULANATE POTASSIUM SCH TAB: 500; 125 TABLET, FILM COATED ORAL at 17:18

## 2018-07-17 RX ADMIN — INSULIN ASPART SCH: 100 INJECTION, SOLUTION INTRAVENOUS; SUBCUTANEOUS at 11:45

## 2018-07-17 RX ADMIN — BETHANECHOL CHLORIDE SCH MG: 25 TABLET ORAL at 13:31

## 2018-07-17 RX ADMIN — GLIMEPIRIDE SCH MG: 2 TABLET ORAL at 06:27

## 2018-07-17 RX ADMIN — ACETAMINOPHEN PRN MG: 325 TABLET ORAL at 16:14

## 2018-07-17 RX ADMIN — INSULIN ASPART SCH UNITS: 100 INJECTION, SOLUTION INTRAVENOUS; SUBCUTANEOUS at 17:18

## 2018-07-17 RX ADMIN — METOPROLOL TARTRATE SCH MG: 25 TABLET, FILM COATED ORAL at 10:46

## 2018-07-17 RX ADMIN — TAMSULOSIN HYDROCHLORIDE SCH MG: 0.4 CAPSULE ORAL at 08:11

## 2018-07-17 RX ADMIN — METOPROLOL TARTRATE SCH MG: 25 TABLET, FILM COATED ORAL at 22:43

## 2018-07-17 RX ADMIN — BETHANECHOL CHLORIDE SCH MG: 25 TABLET ORAL at 06:28

## 2018-07-17 RX ADMIN — INSULIN ASPART SCH UNITS: 100 INJECTION, SOLUTION INTRAVENOUS; SUBCUTANEOUS at 22:48

## 2018-07-17 RX ADMIN — INSULIN ASPART SCH UNITS: 100 INJECTION, SOLUTION INTRAVENOUS; SUBCUTANEOUS at 06:29

## 2018-07-17 NOTE — PN
Progress Note, Physician


History of Present Illness: 





patient unable to pass urine


catheter placed 


will again get voiding trial





- Current Medication List


Current Medications: 


Active Medications





Acetaminophen (Tylenol -)  650 mg PO Q6H PRN


   PRN Reason: FEVER


   Last Admin: 07/16/18 22:18 Dose:  650 mg


Albuterol Sulfate (Ventolin 0.083% Nebulizer Soln -)  1 amp NEB Q1H PRN


   PRN Reason: SHORT OF BREATH/WHEEZING


Amoxicillin/Clavulanate Potassium (Augmentin - 500mg Tablet)  1 tab PO BID@0800,

1730 [facility]


   Last Admin: 07/17/18 08:11 Dose:  1 tab


Bethanechol Chloride (Urecholine -)  50 mg PO TID [facility]


   Last Admin: 07/17/18 13:31 Dose:  50 mg


Glimepiride (Amaryl -)  2 mg PO DAILY@0700 [facility]


   Last Admin: 07/17/18 06:27 Dose:  2 mg


Sodium Chloride (1/2 Normal Saline)  1,000 mls @ 55 mls/hr IV ASDIR [facility]


   Last Admin: 07/16/18 12:07 Dose:  55 mls/hr


Insulin Aspart (Novolog Vial Sliding Scale -)  1 vial SQ [facility]; Protocol


   Last Admin: 07/17/18 11:45 Dose:  Not Given


Metoprolol Tartrate (Lopressor -)  25 mg PO BID [facility]


   Last Admin: 07/17/18 10:46 Dose:  25 mg


Sitagliptin Phosphate (Januvia -)  50 mg PO DAILY@0700 [facility]


   Last Admin: 07/17/18 06:31 Dose:  50 mg


Tamsulosin HCl (Flomax -)  0.4 mg PO DAILY@0830 [facility]


   Last Admin: 07/17/18 08:11 Dose:  0.4 mg











- Objective


Vital Signs: 


 Vital Signs











Temperature  98.4 F   07/17/18 08:51


 


Pulse Rate  98 H  07/17/18 08:51


 


Respiratory Rate  20   07/17/18 08:51


 


Blood Pressure  123/78   07/17/18 08:51


 


O2 Sat by Pulse Oximetry (%)  99   07/17/18 09:00











Constitutional: Yes: No Distress, Calm


Cardiovascular: Yes: Regular Rate and Rhythm


Respiratory: Yes: Regular, CTA Bilaterally


Gastrointestinal: Yes: Normal Bowel Sounds, Soft


Genitourinary: Yes: Lala Present


Musculoskeletal: Yes: WNL


Extremities: Yes: WNL


Neurological: Yes: Alert, Oriented


Psychiatric: Yes: Alert, Oriented


Labs: 


 CBC, BMP





 07/16/18 06:20 





 07/17/18 06:00 





 INR, PTT











INR  1.19  (0.82-1.09)  H  07/09/18  16:58    














Assessment/Plan





Problem List





- Problems


(1) Acute urinary retention


Code(s): R33.8 - OTHER RETENTION OF URINE   





(2) HTN (hypertension)


Code(s): I10 - ESSENTIAL (PRIMARY) HYPERTENSION   


Qualifiers: 


   Hypertension type: essential hypertension   Qualified Code(s): I10 - 

Essential (primary) hypertension   





(3) Hyperkalemia


Code(s): E87.5 - HYPERKALEMIA   





(4) Pedal edema


Code(s): R60.0 - LOCALIZED EDEMA   





(5) Acute bilateral obstructive uropathy


Code(s): N13.9 - OBSTRUCTIVE AND REFLUX UROPATHY, UNSPECIFIED   





6 cellulitis of the left leg





plan


stable off of abx


continue monitoring urine


watch for voiding trials


rest continue current mgmt

## 2018-07-17 NOTE — PN
Progress Note, Physician





- Current Medication List


Current Medications: 


Active Medications





Acetaminophen (Tylenol -)  650 mg PO Q6H PRN


   PRN Reason: FEVER


   Last Admin: 07/16/18 22:18 Dose:  650 mg


Albuterol Sulfate (Ventolin 0.083% Nebulizer Soln -)  1 amp NEB Q1H PRN


   PRN Reason: SHORT OF BREATH/WHEEZING


Amoxicillin/Clavulanate Potassium (Augmentin - 500mg Tablet)  1 tab PO BID@0800,

1730 [facility]


   Last Admin: 07/17/18 08:11 Dose:  1 tab


Bethanechol Chloride (Urecholine -)  50 mg PO TID [facility]


   Last Admin: 07/17/18 13:31 Dose:  50 mg


Glimepiride (Amaryl -)  2 mg PO DAILY@0700 [facility]


   Last Admin: 07/17/18 06:27 Dose:  2 mg


Sodium Chloride (1/2 Normal Saline)  1,000 mls @ 55 mls/hr IV ASDIR [facility]


   Last Admin: 07/16/18 12:07 Dose:  55 mls/hr


Insulin Aspart (Novolog Vial Sliding Scale -)  [facility]; Protocol


   Last Admin: 07/17/18 11:45 Dose:  Not Given


Metoprolol Tartrate (Lopressor -)  25 mg PO BID [facility]


   Last Admin: 07/17/18 10:46 Dose:  25 mg


Sitagliptin Phosphate (Januvia -)  50 mg PO DAILY@0700 [facility]


   Last Admin: 07/17/18 06:31 Dose:  50 mg


Tamsulosin HCl (Flomax -)  0.4 mg PO DAILY@0830 [facility]


   Last Admin: 07/17/18 08:11 Dose:  0.4 mg











- Objective


Vital Signs: 


 Vital Signs











Temperature  98.4 F   07/17/18 08:51


 


Pulse Rate  98 H  07/17/18 08:51


 


Respiratory Rate  20   07/17/18 08:51


 


Blood Pressure  123/78   07/17/18 08:51


 


O2 Sat by Pulse Oximetry (%)  99   07/17/18 09:00











Constitutional: Yes: No Distress


HENT: Yes: Atraumatic


Neck: Yes: Supple


Cardiovascular: Yes: Regular Rate and Rhythm


Respiratory: Yes: CTA Bilaterally


Gastrointestinal: Yes: Normal Bowel Sounds


Extremities: Yes: WNL


Neurological: Yes: Alert, Oriented


Labs: 


 CBC, BMP





 07/16/18 06:20 





 07/17/18 06:00 





 INR, PTT











INR  1.19  (0.82-1.09)  H  07/09/18  16:58    














Problem List





- Problems


(1) Acute renal failure (ARF)


Assessment/Plan: 


martin dc today


trial of voiding failed so martin was put back in


on flomax and urocholine


Code(s): N17.9 - ACUTE KIDNEY FAILURE, UNSPECIFIED   


Qualifiers: 


   Acute renal failure type: unspecified   Qualified Code(s): N17.9 - Acute 

kidney failure, unspecified   





(2) Acute urinary retention


Assessment/Plan: 


martin dc


Code(s): R33.8 - OTHER RETENTION OF URINE   





(3) HTN (hypertension)


Assessment/Plan: 


on meds 


monitor





Code(s): I10 - ESSENTIAL (PRIMARY) HYPERTENSION   


Qualifiers: 


   Hypertension type: essential hypertension   Qualified Code(s): I10 - 

Essential (primary) hypertension   





(4) Hyperkalemia


Assessment/Plan: 


resolved


Code(s): E87.5 - HYPERKALEMIA   





(5) Pedal edema


Code(s): R60.0 - LOCALIZED EDEMA   





(6) Acute bilateral obstructive uropathy


Code(s): N13.9 - OBSTRUCTIVE AND REFLUX UROPATHY, UNSPECIFIED   





(7) Rash and nonspecific skin eruption


Code(s): R21 - RASH AND OTHER NONSPECIFIC SKIN ERUPTION

## 2018-07-17 NOTE — PN
Progress Note, Physician


History of Present Illness: 





Pt seen and examined at bedside. She is awake and alert. She is getting another 

voiding trial. 





- Current Medication List


Current Medications: 


Active Medications





Acetaminophen (Tylenol -)  650 mg PO Q6H PRN


   PRN Reason: FEVER


   Last Admin: 07/16/18 22:18 Dose:  650 mg


Albuterol Sulfate (Ventolin 0.083% Nebulizer Soln -)  1 amp NEB Q1H PRN


   PRN Reason: SHORT OF BREATH/WHEEZING


Amoxicillin/Clavulanate Potassium (Augmentin - 500mg Tablet)  1 tab PO BID@0800,

1730 [facility]


   Last Admin: 07/17/18 08:11 Dose:  1 tab


Bethanechol Chloride (Urecholine -)  50 mg PO TID [facility]


   Last Admin: 07/17/18 13:31 Dose:  50 mg


Glimepiride (Amaryl -)  2 mg PO DAILY@0700 [facility]


   Last Admin: 07/17/18 06:27 Dose:  2 mg


Sodium Chloride (1/2 Normal Saline)  1,000 mls @ 55 mls/hr IV ASDIR [facility]


   Last Admin: 07/16/18 12:07 Dose:  55 mls/hr


Insulin Aspart (Novolog Vial Sliding Scale -)  [facility]; Protocol


   Last Admin: 07/17/18 11:45 Dose:  Not Given


Metoprolol Tartrate (Lopressor -)  25 mg PO BID [facility]


   Last Admin: 07/17/18 10:46 Dose:  25 mg


Sitagliptin Phosphate (Januvia -)  50 mg PO DAILY@0700 [facility]


   Last Admin: 07/17/18 06:31 Dose:  50 mg


Tamsulosin HCl (Flomax -)  0.4 mg PO DAILY@0830 [facility]


   Last Admin: 07/17/18 08:11 Dose:  0.4 mg











- Objective


Vital Signs: 


 Vital Signs











Temperature  98.5 F   07/17/18 15:18


 


Pulse Rate  98 H  07/17/18 15:18


 


Respiratory Rate  20   07/17/18 15:18


 


Blood Pressure  123/78   07/17/18 08:51


 


O2 Sat by Pulse Oximetry (%)  99   07/17/18 09:00











Constitutional: Yes: Calm


Eyes: Yes: Conjunctiva Clear


HENT: Yes: Atraumatic


Neck: Yes: Supple


Cardiovascular: Yes: S1, S2


Respiratory: Yes: CTA Bilaterally


Gastrointestinal: Yes: Soft, Abdomen, Obese


Genitourinary: Yes: WNL


Musculoskeletal: Yes: WNL


Edema: No


Neurological: Yes: Oriented


Psychiatric: Yes: Oriented


Labs: 


 CBC, BMP





 07/16/18 06:20 





 07/17/18 06:00 





 INR, PTT











INR  1.19  (0.82-1.09)  H  07/09/18  16:58    














Problem List





- Problems


(1) Acute renal failure (ARF)


Code(s): N17.9 - ACUTE KIDNEY FAILURE, UNSPECIFIED   


Qualifiers: 


   Acute renal failure type: unspecified   Qualified Code(s): N17.9 - Acute 

kidney failure, unspecified   





(2) Acute urinary retention


Code(s): R33.8 - OTHER RETENTION OF URINE   





(3) HTN (hypertension)


Code(s): I10 - ESSENTIAL (PRIMARY) HYPERTENSION   


Qualifiers: 


   Hypertension type: essential hypertension   Qualified Code(s): I10 - 

Essential (primary) hypertension   





(4) Hyperkalemia


Code(s): E87.5 - HYPERKALEMIA   





Assessment/Plan


 Current Medications











Generic Name Dose Route Start Last Admin





  Trade Name Freq  PRN Reason Stop Dose Admin


 


Acetaminophen  650 mg  07/13/18 14:34  07/16/18 22:18





  Tylenol -  PO   650 mg





  Q6H PRN   Administration





  FEVER   





     





     





     


 


Albuterol Sulfate  1 amp  07/13/18 14:34  





  Ventolin 0.083% Nebulizer Soln -  NEB   





  Q1H PRN   





  SHORT OF BREATH/WHEEZING   





     





     





     


 


Amoxicillin/Clavulanate Potassium  1 tab  07/16/18 17:30  07/17/18 08:11





  Augmentin - 500mg Tablet  PO   1 tab





  BID@0800,1730 [facility]   Administration





     





     





     





     


 


Bethanechol Chloride  50 mg  07/16/18 14:15  07/17/18 13:31





  Urecholine -  PO   50 mg





  TID [facility]   Administration





     





     





     





     


 


Glimepiride  2 mg  07/17/18 07:00  07/17/18 06:27





  Amaryl -  PO   2 mg





  DAILY@0700 [facility]   Administration





     





     





     





     


 


Sodium Chloride  1,000 mls @ 55 mls/hr  07/16/18 11:35  07/16/18 12:07





  1/2 Normal Saline  IV   55 mls/hr





  ASDIR [facility]   Administration





     





     





     





     


 


Insulin Aspart  1 vial  07/13/18 16:30  07/17/18 11:45





  Novolog Vial Sliding Scale -  SQ   Not Given





  [facility]   





     





     





  Protocol   





     


 


Metoprolol Tartrate  25 mg  07/13/18 22:00  07/17/18 10:46





  Lopressor -  PO   25 mg





  BID [facility]   Administration





     





     





     





     


 


Sitagliptin Phosphate  50 mg  07/17/18 07:00  07/17/18 06:31





  Januvia -  PO   50 mg





  DAILY@0700 [facility]   Administration





     





     





     





     


 


Tamsulosin HCl  0.4 mg  07/16/18 14:15  07/17/18 08:11





  Flomax -  PO   0.4 mg





  DAILY@0830 [facility]   Administration





     





     





     





     











Impression


1. JANEL


2. hyperkalemia


3. urinary retention


4. constipation


5. left leg edema


6. rash


7. DM


8. hx of hep b exposure





Plan


- renal function continues to improve


- urology follow up for plan


- follow biopsies


- will see in office after discharge


- monitor bp


- monitor blood sugar, will need DM education 


- will follow





Dr Fonseca

## 2018-07-17 NOTE — PN
Progress Note (short form)





- Note


Progress Note: 





failed voiding trial yesterday


creat 1.7


started on urecholine/flomax


repeat TOV today

## 2018-07-17 NOTE — PATH
Surgical Pathology Report



Patient Name:  LUIS GANN

Accession #:  [id number]

[facility]. Rec. #:  [id number]                                                        

   /Age/Gender:  1965 (Age: 53) / F

Account:  [id number]                                                          

             Location: [address] [place] [address]/[facility]

Taken:  2018

Received:  2018

Reported:  2018

Physicians:  THANIA Yeboah M.D.

  



Specimen(s) Received

 POSTERIOR BLADDER WALL ERYTHEMA 





Clinical History

Gross hematuria







Final Diagnosis

POSTERIOR BLADDER WALL, ERYTHEMA, BIOPSY:

POLYPOID BLADDER MUCOSA WITH LAMINA PROPRIA EDEMA, HEMORRHAGE AND INCREASED

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 

PROMINENT CAUTERY ARTIFACT PRESENT.





***Electronically Signed***

Yary Cardona M.D.





Gross Description

Received in formalin labeled "posterior bladder wall erythema," is a 0.5 cm in

greatest dimension tan brown soft tissue fragment. The specimen is submitted in

toto in one cassette.



/2018

## 2018-07-18 LAB
ALBUMIN SERPL-MCNC: 3.4 G/DL (ref 3.4–5)
ALP SERPL-CCNC: 177 U/L (ref 45–117)
ALT SERPL-CCNC: 36 U/L (ref 12–78)
ANION GAP SERPL CALC-SCNC: 11 MMOL/L (ref 8–16)
AST SERPL-CCNC: 23 U/L (ref 15–37)
BASOPHILS # BLD: 0.7 % (ref 0–2)
BILIRUB SERPL-MCNC: 0.3 MG/DL (ref 0.2–1)
BUN SERPL-MCNC: 31 MG/DL (ref 7–18)
CALCIUM SERPL-MCNC: 8.6 MG/DL (ref 8.5–10.1)
CHLORIDE SERPL-SCNC: 103 MMOL/L (ref 98–107)
CO2 SERPL-SCNC: 25 MMOL/L (ref 21–32)
CREAT SERPL-MCNC: 2.1 MG/DL (ref 0.55–1.02)
DEPRECATED RDW RBC AUTO: 12.3 % (ref 11.6–15.6)
EOSINOPHIL # BLD: 2.7 % (ref 0–4.5)
GLUCOSE SERPL-MCNC: 108 MG/DL (ref 74–106)
HCT VFR BLD CALC: 29.9 % (ref 32.4–45.2)
HGB BLD-MCNC: 10.2 GM/DL (ref 10.7–15.3)
LYMPHOCYTES # BLD: 18.9 % (ref 8–40)
MCH RBC QN AUTO: 29.5 PG (ref 25.7–33.7)
MCHC RBC AUTO-ENTMCNC: 34.1 G/DL (ref 32–36)
MCV RBC: 86.7 FL (ref 80–96)
MONOCYTES # BLD AUTO: 8.3 % (ref 3.8–10.2)
NEUTROPHILS # BLD: 69.4 % (ref 42.8–82.8)
PLATELET # BLD AUTO: 454 K/MM3 (ref 134–434)
PMV BLD: 8.2 FL (ref 7.5–11.1)
POTASSIUM SERPLBLD-SCNC: 4.6 MMOL/L (ref 3.5–5.1)
PROT SERPL-MCNC: 7.6 G/DL (ref 6.4–8.2)
RBC # BLD AUTO: 3.45 M/MM3 (ref 3.6–5.2)
SODIUM SERPL-SCNC: 139 MMOL/L (ref 136–145)
WBC # BLD AUTO: 12.7 K/MM3 (ref 4–10)

## 2018-07-18 RX ADMIN — GLIMEPIRIDE SCH MG: 2 TABLET ORAL at 06:14

## 2018-07-18 RX ADMIN — INSULIN ASPART SCH UNITS: 100 INJECTION, SOLUTION INTRAVENOUS; SUBCUTANEOUS at 11:58

## 2018-07-18 RX ADMIN — INSULIN ASPART SCH UNITS: 100 INJECTION, SOLUTION INTRAVENOUS; SUBCUTANEOUS at 21:41

## 2018-07-18 RX ADMIN — ACETAMINOPHEN PRN MG: 325 TABLET ORAL at 05:23

## 2018-07-18 RX ADMIN — AMOXICILLIN AND CLAVULANATE POTASSIUM SCH TAB: 500; 125 TABLET, FILM COATED ORAL at 08:45

## 2018-07-18 RX ADMIN — AMOXICILLIN AND CLAVULANATE POTASSIUM SCH TAB: 500; 125 TABLET, FILM COATED ORAL at 17:17

## 2018-07-18 RX ADMIN — ACETAMINOPHEN PRN MG: 325 TABLET ORAL at 13:15

## 2018-07-18 RX ADMIN — METOPROLOL TARTRATE SCH MG: 25 TABLET, FILM COATED ORAL at 21:31

## 2018-07-18 RX ADMIN — TAMSULOSIN HYDROCHLORIDE SCH MG: 0.4 CAPSULE ORAL at 08:45

## 2018-07-18 RX ADMIN — INSULIN ASPART SCH UNITS: 100 INJECTION, SOLUTION INTRAVENOUS; SUBCUTANEOUS at 06:17

## 2018-07-18 RX ADMIN — INSULIN ASPART SCH: 100 INJECTION, SOLUTION INTRAVENOUS; SUBCUTANEOUS at 17:16

## 2018-07-18 RX ADMIN — BETHANECHOL CHLORIDE SCH MG: 25 TABLET ORAL at 13:15

## 2018-07-18 RX ADMIN — METOPROLOL TARTRATE SCH MG: 25 TABLET, FILM COATED ORAL at 10:40

## 2018-07-18 RX ADMIN — BETHANECHOL CHLORIDE SCH MG: 25 TABLET ORAL at 21:31

## 2018-07-18 RX ADMIN — BETHANECHOL CHLORIDE SCH MG: 25 TABLET ORAL at 05:23

## 2018-07-18 NOTE — PN
Progress Note, Physician


History of Present Illness: 





Pt seen and examined at bedside. She is awake and alert. She denies dysuria. 





- Current Medication List


Current Medications: 


Active Medications





Acetaminophen (Tylenol -)  650 mg PO Q6H PRN


   PRN Reason: FEVER


   Last Admin: 07/18/18 05:23 Dose:  650 mg


Albuterol Sulfate (Ventolin 0.083% Nebulizer Soln -)  1 amp NEB Q1H PRN


   PRN Reason: SHORT OF BREATH/WHEEZING


Amoxicillin/Clavulanate Potassium (Augmentin - 500mg Tablet)  1 tab PO BID@0800,

1730 [facility]


   Last Admin: 07/18/18 08:45 Dose:  1 tab


Bethanechol Chloride (Urecholine -)  50 mg PO TID [facility]


   Last Admin: 07/18/18 05:23 Dose:  50 mg


Glimepiride (Amaryl -)  2 mg PO DAILY@0700 [facility]


   Last Admin: 07/18/18 06:14 Dose:  2 mg


Insulin Aspart (Novolog Vial Sliding Scale -)  [facility]; Protocol


   Last Admin: 07/18/18 11:58 Dose:  2 units


Metoprolol Tartrate (Lopressor -)  25 mg PO BID [facility]


   Last Admin: 07/18/18 10:40 Dose:  25 mg


Sitagliptin Phosphate (Januvia -)  50 mg PO DAILY@0700 [facility]


   Last Admin: 07/18/18 06:14 Dose:  50 mg


Tamsulosin HCl (Flomax -)  0.4 mg PO DAILY@0830 [facility]


   Last Admin: 07/18/18 08:45 Dose:  0.4 mg











- Objective


Vital Signs: 


 Vital Signs











Temperature  97.8 F   07/18/18 08:58


 


Pulse Rate  89   07/18/18 08:58


 


Respiratory Rate  20   07/18/18 08:58


 


Blood Pressure  129/70   07/18/18 08:58


 


O2 Sat by Pulse Oximetry (%)  96   07/17/18 21:00











Constitutional: Yes: Calm


Eyes: Yes: Conjunctiva Clear


HENT: Yes: Atraumatic


Neck: Yes: Supple


Cardiovascular: Yes: S1, S2


Respiratory: Yes: CTA Bilaterally


Gastrointestinal: Yes: Soft, Abdomen, Obese


Genitourinary: Yes: Other (distended bladder)


Musculoskeletal: Yes: WNL


Edema: No


Neurological: Yes: Oriented


Psychiatric: Yes: Oriented


Labs: 


 CBC, BMP





 07/18/18 06:06 





 07/18/18 06:06 





 INR, PTT











INR  1.19  (0.82-1.09)  H  07/09/18  16:58    














Problem List





- Problems


(1) Acute renal failure (ARF)


Code(s): N17.9 - ACUTE KIDNEY FAILURE, UNSPECIFIED   


Qualifiers: 


   Acute renal failure type: unspecified   Qualified Code(s): N17.9 - Acute 

kidney failure, unspecified   





(2) Acute urinary retention


Code(s): R33.8 - OTHER RETENTION OF URINE   





(3) HTN (hypertension)


Code(s): I10 - ESSENTIAL (PRIMARY) HYPERTENSION   


Qualifiers: 


   Hypertension type: essential hypertension   Qualified Code(s): I10 - 

Essential (primary) hypertension   





(4) Hyperkalemia


Code(s): E87.5 - HYPERKALEMIA   





Assessment/Plan


 Current Medications











Generic Name Dose Route Start Last Admin





  Trade Name Freq  PRN Reason Stop Dose Admin


 


Acetaminophen  650 mg  07/13/18 14:34  07/18/18 05:23





  Tylenol -  PO   650 mg





  Q6H PRN   Administration





  FEVER   





     





     





     


 


Albuterol Sulfate  1 amp  07/13/18 14:34  





  Ventolin 0.083% Nebulizer Soln -  NEB   





  Q1H PRN   





  SHORT OF BREATH/WHEEZING   





     





     





     


 


Amoxicillin/Clavulanate Potassium  1 tab  07/16/18 17:30  07/18/18 08:45





  Augmentin - 500mg Tablet  PO   1 tab





  BID@0800,1730 [facility]   Administration





     





     





     





     


 


Bethanechol Chloride  50 mg  07/16/18 14:15  07/18/18 05:23





  Urecholine -  PO   50 mg





  TID [facility]   Administration





     





     





     





     


 


Glimepiride  2 mg  07/17/18 07:00  07/18/18 06:14





  Amaryl -  PO   2 mg





  DAILY@0700 [facility]   Administration





     





     





     





     


 


Insulin Aspart  1 vial  07/13/18 16:30  07/18/18 11:58





  Novolog Vial Sliding Scale -  SQ   2 units





  [facility]   Administration





     





     





  Protocol   





     


 


Metoprolol Tartrate  25 mg  07/13/18 22:00  07/18/18 10:40





  Lopressor -  PO   25 mg





  BID [facility]   Administration





     





     





     





     


 


Sitagliptin Phosphate  50 mg  07/17/18 07:00  07/18/18 06:14





  Januvia -  PO   50 mg





  DAILY@0700 [facility]   Administration





     





     





     





     


 


Tamsulosin HCl  0.4 mg  07/16/18 14:15  07/18/18 08:45





  Flomax -  PO   0.4 mg





  DAILY@0830 [facility]   Administration





     





     





     





     











Impression


1. JANEL


2. hyperkalemia


3. urinary retention


4. constipation


5. left leg edema


6. rash


7. DM


8. hx of hep b exposure








Plan


- check bladder scan, spoke to nurse to check and call me


- suspect she is obstructed again as creatinine is higher


- pt did have difficulty voiding


- follow skin biopsy report


- monitor bp


- monitor blood sugar


- will follow





Dr Fonseca

## 2018-07-18 NOTE — PN
Progress Note, Physician


History of Present Illness: 


Ambulating and tolerating voiding trial.








- Current Medication List


Current Medications: 


Active Medications





Acetaminophen (Tylenol -)  650 mg PO Q6H PRN


   PRN Reason: FEVER


   Last Admin: 07/18/18 05:23 Dose:  650 mg


Albuterol Sulfate (Ventolin 0.083% Nebulizer Soln -)  1 amp NEB Q1H PRN


   PRN Reason: SHORT OF BREATH/WHEEZING


Amoxicillin/Clavulanate Potassium (Augmentin - 500mg Tablet)  1 tab PO BID@0800,

1730 [facility]


   Last Admin: 07/18/18 08:45 Dose:  1 tab


Bethanechol Chloride (Urecholine -)  50 mg PO TID [facility]


   Last Admin: 07/18/18 05:23 Dose:  50 mg


Glimepiride (Amaryl -)  2 mg PO DAILY@0700 [facility]


   Last Admin: 07/18/18 06:14 Dose:  2 mg


Insulin Aspart (Novolog Vial Sliding Scale -)  [facility]; Protocol


   Last Admin: 07/18/18 11:58 Dose:  2 units


Metoprolol Tartrate (Lopressor -)  25 mg PO BID [facility]


   Last Admin: 07/18/18 10:40 Dose:  25 mg


Sitagliptin Phosphate (Januvia -)  50 mg PO DAILY@0700 [facility]


   Last Admin: 07/18/18 06:14 Dose:  50 mg


Tamsulosin HCl (Flomax -)  0.4 mg PO DAILY@0830 [facility]


   Last Admin: 07/18/18 08:45 Dose:  0.4 mg











- Objective


Vital Signs: 


 Vital Signs











Temperature  97.8 F   07/18/18 08:58


 


Pulse Rate  89   07/18/18 08:58


 


Respiratory Rate  20   07/18/18 08:58


 


Blood Pressure  129/70   07/18/18 08:58


 


O2 Sat by Pulse Oximetry (%)  96   07/17/18 21:00











Constitutional: Yes: No Distress, Calm


Labs: 


 CBC, BMP





 07/18/18 06:06 





 07/18/18 06:06 





 INR, PTT











INR  1.19  (0.82-1.09)  H  07/09/18  16:58    














Problem List





- Problems


(1) Acute urinary retention


Code(s): R33.8 - OTHER RETENTION OF URINE   





(2) HTN (hypertension)


Code(s): I10 - ESSENTIAL (PRIMARY) HYPERTENSION   


Qualifiers: 


   Hypertension type: essential hypertension   Qualified Code(s): I10 - 

Essential (primary) hypertension   





(3) Pedal edema


Code(s): R60.0 - LOCALIZED EDEMA   





(4) Acute bilateral obstructive uropathy


Code(s): N13.9 - OBSTRUCTIVE AND REFLUX UROPATHY, UNSPECIFIED   





(5) Leukocytoclastic vasculitis


Code(s): M31.0 - HYPERSENSITIVITY ANGIITIS   





(6) Hemorrhagic cystitis


Code(s): N30.91 - CYSTITIS, UNSPECIFIED WITH HEMATURIA   





Assessment/Plan


Transthoracic echocardiography to assess LV/RV and valvular function





1. Sinus tachycardia and hypertension improved


2. Acute on CKD and hyperkalemia referable to obstructive uropathy with urinary 

retention and moderate bilateral hydronephrosis improving


3. Hemorrhagic cystitis


4. Left pedal edema, etiology unclear 


5. Left leg rash referable to leukocytoclastic vasculitis, cellulitis





PLAN:


1. Undergoing voiding trial


2. Continue Lopressor 25 bid


3. Complete empiric antibiotics course


4. Monitor electrolytes, bladder biopsy c/w hemorrhagic cystitis and skin 

biopsy c/w leukocytoclastic vasculitis


5. DVT prophylaxis

## 2018-07-18 NOTE — PN
Progress Note, Physician


History of Present Illness: 





patient doing well


no complaints


passed urine





- Current Medication List


Current Medications: 


Active Medications





Acetaminophen (Tylenol -)  650 mg PO Q6H PRN


   PRN Reason: FEVER


   Last Admin: 07/18/18 05:23 Dose:  650 mg


Albuterol Sulfate (Ventolin 0.083% Nebulizer Soln -)  1 amp NEB Q1H PRN


   PRN Reason: SHORT OF BREATH/WHEEZING


Amoxicillin/Clavulanate Potassium (Augmentin - 500mg Tablet)  1 tab PO BID@0800,

1730 [facility]


   Last Admin: 07/18/18 08:45 Dose:  1 tab


Bethanechol Chloride (Urecholine -)  50 mg PO TID [facility]


   Last Admin: 07/18/18 05:23 Dose:  50 mg


Glimepiride (Amaryl -)  2 mg PO DAILY@0700 [facility]


   Last Admin: 07/18/18 06:14 Dose:  2 mg


Insulin Aspart (Novolog Vial Sliding Scale -)  [facility]; Protocol


   Last Admin: 07/18/18 11:58 Dose:  2 units


Metoprolol Tartrate (Lopressor -)  25 mg PO BID [facility]


   Last Admin: 07/18/18 10:40 Dose:  25 mg


Sitagliptin Phosphate (Januvia -)  50 mg PO DAILY@0700 [facility]


   Last Admin: 07/18/18 06:14 Dose:  50 mg


Tamsulosin HCl (Flomax -)  0.4 mg PO DAILY@0830 [facility]


   Last Admin: 07/18/18 08:45 Dose:  0.4 mg











- Objective


Vital Signs: 


 Vital Signs











Temperature  97.8 F   07/18/18 08:58


 


Pulse Rate  89   07/18/18 08:58


 


Respiratory Rate  20   07/18/18 08:58


 


Blood Pressure  129/70   07/18/18 08:58


 


O2 Sat by Pulse Oximetry (%)  96   07/17/18 21:00











Constitutional: Yes: No Distress, Calm


Cardiovascular: Yes: Regular Rate and Rhythm


Respiratory: Yes: Regular, CTA Bilaterally


Gastrointestinal: Yes: Normal Bowel Sounds, Soft


Musculoskeletal: Yes: WNL


Extremities: Yes: WNL


Neurological: Yes: Alert, Oriented


Psychiatric: Yes: Alert, Oriented


Labs: 


 CBC, BMP





 07/18/18 06:06 





 07/18/18 06:06 





 INR, PTT











INR  1.19  (0.82-1.09)  H  07/09/18  16:58    














Assessment/Plan





Problem List





- Problems


(1) Acute urinary retention


Code(s): R33.8 - OTHER RETENTION OF URINE   





(2) HTN (hypertension)


Code(s): I10 - ESSENTIAL (PRIMARY) HYPERTENSION   


Qualifiers: 


   Hypertension type: essential hypertension   Qualified Code(s): I10 - 

Essential (primary) hypertension   





(3) Hyperkalemia


Code(s): E87.5 - HYPERKALEMIA   





(4) Pedal edema


Code(s): R60.0 - LOCALIZED EDEMA   





(5) Acute bilateral obstructive uropathy


Code(s): N13.9 - OBSTRUCTIVE AND REFLUX UROPATHY, UNSPECIFIED   





6 cellulitis of the left leg





plan


continue current mgmt


can stop abx tomorrow which are oral


rest continue current mgmt


patient stable

## 2018-07-18 NOTE — PN
Progress Note, Physician


History of Present Illness: 


martin put back in as she was retaining urine


cr went up





- Current Medication List


Current Medications: 


Active Medications





Acetaminophen (Tylenol -)  650 mg PO Q6H PRN


   PRN Reason: FEVER


   Last Admin: 07/18/18 13:15 Dose:  650 mg


Amoxicillin/Clavulanate Potassium (Augmentin - 500mg Tablet)  1 tab PO BID@0800,

1730 [facility]


   Last Admin: 07/18/18 17:17 Dose:  1 tab


Bethanechol Chloride (Urecholine -)  50 mg PO TID [facility]


   Last Admin: 07/18/18 13:15 Dose:  50 mg


Glimepiride (Amaryl -)  2 mg PO DAILY@0700 [facility]


   Last Admin: 07/18/18 06:14 Dose:  2 mg


Insulin Aspart (Novolog Vial Sliding Scale -)  [facility] Protocol


   Last Admin: 07/18/18 17:16 Dose:  Not Given


Metoprolol Tartrate (Lopressor -)  25 mg PO BID [facility]


   Last Admin: 07/18/18 10:40 Dose:  25 mg


Sitagliptin Phosphate (Januvia -)  50 mg PO DAILY@0700 [facility]


   Last Admin: 07/18/18 06:14 Dose:  50 mg


Tamsulosin HCl (Flomax -)  0.4 mg PO DAILY@0830 [facility]


   Last Admin: 07/18/18 08:45 Dose:  0.4 mg











- Objective


Vital Signs: 


 Vital Signs











Temperature  98.5 F   07/18/18 15:10


 


Pulse Rate  88   07/18/18 15:10


 


Respiratory Rate  20   07/18/18 15:10


 


Blood Pressure  114/66   07/18/18 15:10


 


O2 Sat by Pulse Oximetry (%)  96   07/18/18 09:00











Constitutional: Yes: Anxious


HENT: Yes: Atraumatic


Neck: Yes: Supple


Cardiovascular: Yes: Regular Rate and Rhythm


Respiratory: Yes: CTA Bilaterally


Gastrointestinal: Yes: Normal Bowel Sounds


Extremities: Yes: WNL


Neurological: Yes: Alert, Oriented


Labs: 


 CBC, BMP





 07/18/18 06:06 





 07/18/18 06:06 





 INR, PTT











INR  1.19  (0.82-1.09)  H  07/09/18  16:58    














Problem List





- Problems


(1) Acute renal failure (ARF)


Assessment/Plan: 


martin inserted again as she was retaining


cr elevated





Code(s): N17.9 - ACUTE KIDNEY FAILURE, UNSPECIFIED   


Qualifiers: 


   Acute renal failure type: unspecified   Qualified Code(s): N17.9 - Acute 

kidney failure, unspecified   





(2) Acute urinary retention


Assessment/Plan: 


martin in place


monitor cr


Code(s): R33.8 - OTHER RETENTION OF URINE   





(3) HTN (hypertension)


Assessment/Plan: 


on meds 


monitor





Code(s): I10 - ESSENTIAL (PRIMARY) HYPERTENSION   


Qualifiers: 


   Hypertension type: essential hypertension   Qualified Code(s): I10 - 

Essential (primary) hypertension   





(4) Hyperkalemia


Assessment/Plan: 


resolved


Code(s): E87.5 - HYPERKALEMIA   





(5) Pedal edema


Code(s): R60.0 - LOCALIZED EDEMA   





(6) Acute bilateral obstructive uropathy


Code(s): N13.9 - OBSTRUCTIVE AND REFLUX UROPATHY, UNSPECIFIED   





(7) Rash and nonspecific skin eruption


Assessment/Plan: 


derm consult..reviewed


cellulitis


start abx


biopsy done by derm


Code(s): R21 - RASH AND OTHER NONSPECIFIC SKIN ERUPTION

## 2018-07-19 VITALS — HEART RATE: 92 BPM | DIASTOLIC BLOOD PRESSURE: 72 MMHG | SYSTOLIC BLOOD PRESSURE: 122 MMHG | TEMPERATURE: 99.4 F

## 2018-07-19 LAB
ALBUMIN SERPL-MCNC: 3.5 G/DL (ref 3.4–5)
ALP SERPL-CCNC: 158 U/L (ref 45–117)
ALT SERPL-CCNC: 31 U/L (ref 12–78)
ANION GAP SERPL CALC-SCNC: 10 MMOL/L (ref 8–16)
AST SERPL-CCNC: 18 U/L (ref 15–37)
BILIRUB SERPL-MCNC: 0.3 MG/DL (ref 0.2–1)
BUN SERPL-MCNC: 25 MG/DL (ref 7–18)
CALCIUM SERPL-MCNC: 9 MG/DL (ref 8.5–10.1)
CHLORIDE SERPL-SCNC: 108 MMOL/L (ref 98–107)
CO2 SERPL-SCNC: 25 MMOL/L (ref 21–32)
CREAT SERPL-MCNC: 1.8 MG/DL (ref 0.55–1.02)
DEPRECATED RDW RBC AUTO: 12.4 % (ref 11.6–15.6)
GLUCOSE SERPL-MCNC: 89 MG/DL (ref 74–106)
HCT VFR BLD CALC: 30 % (ref 32.4–45.2)
HGB BLD-MCNC: 10.2 GM/DL (ref 10.7–15.3)
MCH RBC QN AUTO: 29.7 PG (ref 25.7–33.7)
MCHC RBC AUTO-ENTMCNC: 34.1 G/DL (ref 32–36)
MCV RBC: 87 FL (ref 80–96)
PLATELET # BLD AUTO: 476 K/MM3 (ref 134–434)
PMV BLD: 8 FL (ref 7.5–11.1)
POTASSIUM SERPLBLD-SCNC: 4.2 MMOL/L (ref 3.5–5.1)
PROT SERPL-MCNC: 7.9 G/DL (ref 6.4–8.2)
RBC # BLD AUTO: 3.45 M/MM3 (ref 3.6–5.2)
SODIUM SERPL-SCNC: 143 MMOL/L (ref 136–145)
WBC # BLD AUTO: 9.1 K/MM3 (ref 4–10)

## 2018-07-19 RX ADMIN — AMOXICILLIN AND CLAVULANATE POTASSIUM SCH TAB: 500; 125 TABLET, FILM COATED ORAL at 08:16

## 2018-07-19 RX ADMIN — BETHANECHOL CHLORIDE SCH MG: 25 TABLET ORAL at 06:00

## 2018-07-19 RX ADMIN — INSULIN ASPART SCH UNITS: 100 INJECTION, SOLUTION INTRAVENOUS; SUBCUTANEOUS at 06:04

## 2018-07-19 RX ADMIN — GLIMEPIRIDE SCH MG: 2 TABLET ORAL at 06:00

## 2018-07-19 RX ADMIN — AMOXICILLIN AND CLAVULANATE POTASSIUM SCH TAB: 500; 125 TABLET, FILM COATED ORAL at 16:36

## 2018-07-19 RX ADMIN — METOPROLOL TARTRATE SCH MG: 25 TABLET, FILM COATED ORAL at 09:10

## 2018-07-19 RX ADMIN — INSULIN ASPART SCH: 100 INJECTION, SOLUTION INTRAVENOUS; SUBCUTANEOUS at 10:23

## 2018-07-19 RX ADMIN — BETHANECHOL CHLORIDE SCH MG: 25 TABLET ORAL at 13:32

## 2018-07-19 RX ADMIN — TAMSULOSIN HYDROCHLORIDE SCH MG: 0.4 CAPSULE ORAL at 08:16

## 2018-07-19 RX ADMIN — INSULIN ASPART SCH: 100 INJECTION, SOLUTION INTRAVENOUS; SUBCUTANEOUS at 16:35

## 2018-07-19 RX ADMIN — ACETAMINOPHEN PRN MG: 325 TABLET ORAL at 00:31

## 2018-07-19 NOTE — PN
Progress Note, Physician


History of Present Illness: 





Pt seen and examined at bedside. She is awake and alert. She is anxious today. 

Martin in place. 





- Current Medication List


Current Medications: 


Active Medications





Acetaminophen (Tylenol -)  650 mg PO Q6H PRN


   PRN Reason: FEVER


   Last Admin: 07/19/18 00:31 Dose:  650 mg


Amoxicillin/Clavulanate Potassium (Augmentin - 500mg Tablet)  1 tab PO BID@0800,

1730 [facility]


   Last Admin: 07/19/18 16:36 Dose:  1 tab


Bethanechol Chloride (Urecholine -)  50 mg PO TID [facility]


   Last Admin: 07/19/18 13:32 Dose:  50 mg


Glimepiride (Amaryl -)  2 mg PO DAILY@0700 [facility]


   Last Admin: 07/19/18 06:00 Dose:  2 mg


Insulin Aspart (Novolog Vial Sliding Scale -)  [facility]; Protocol


   Last Admin: 07/19/18 16:35 Dose:  Not Given


Metoprolol Tartrate (Lopressor -)  25 mg PO BID [facility]


   Last Admin: 07/19/18 09:10 Dose:  25 mg


Sitagliptin Phosphate (Januvia -)  50 mg PO DAILY@0700 [facility]


   Last Admin: 07/19/18 06:01 Dose:  50 mg


Tamsulosin HCl (Flomax -)  0.4 mg PO DAILY@0830 [facility]


   Last Admin: 07/19/18 08:16 Dose:  0.4 mg











- Objective


Vital Signs: 


 Vital Signs











Temperature  99.4 F   07/19/18 15:43


 


Pulse Rate  92 H  07/19/18 15:43


 


Respiratory Rate  22   07/19/18 15:43


 


Blood Pressure  122/72   07/19/18 15:43


 


O2 Sat by Pulse Oximetry (%)  95   07/19/18 08:28











Constitutional: Yes: Anxious


Eyes: Yes: Conjunctiva Clear


HENT: Yes: Atraumatic


Neck: Yes: Supple


Cardiovascular: Yes: S1, S2


Respiratory: Yes: CTA Bilaterally


Gastrointestinal: Yes: Normal Bowel Sounds, Soft


Genitourinary: Yes: Martin Present


Musculoskeletal: Yes: WNL


Edema: No


Neurological: Yes: Oriented


Psychiatric: Yes: Oriented


Labs: 


 CBC, BMP





 07/19/18 09:33 





 07/19/18 09:33 





 INR, PTT











INR  1.19  (0.82-1.09)  H  07/09/18  16:58    














Problem List





- Problems


(1) Acute renal failure (ARF)


Code(s): N17.9 - ACUTE KIDNEY FAILURE, UNSPECIFIED   


Qualifiers: 


   Acute renal failure type: unspecified   Qualified Code(s): N17.9 - Acute 

kidney failure, unspecified   





(2) Acute urinary retention


Code(s): R33.8 - OTHER RETENTION OF URINE   





(3) HTN (hypertension)


Code(s): I10 - ESSENTIAL (PRIMARY) HYPERTENSION   


Qualifiers: 


   Hypertension type: essential hypertension   Qualified Code(s): I10 - 

Essential (primary) hypertension   





(4) Hyperkalemia


Code(s): E87.5 - HYPERKALEMIA   





Assessment/Plan


 Current Medications











Generic Name Dose Route Start Last Admin





  Trade Name Freq  PRN Reason Stop Dose Admin


 


Acetaminophen  650 mg  07/13/18 14:34  07/19/18 00:31





  Tylenol -  PO   650 mg





  Q6H PRN   Administration





  FEVER   





     





     





     


 


Amoxicillin/Clavulanate Potassium  1 tab  07/16/18 17:30  07/19/18 16:36





  Augmentin - 500mg Tablet  PO   1 tab





  BID@0800,1730 [facility]   Administration





     





     





     





     


 


Bethanechol Chloride  50 mg  07/16/18 14:15  07/19/18 13:32





  Urecholine -  PO   50 mg





  TID [facility]   Administration





     





     





     





     


 


Glimepiride  2 mg  07/17/18 07:00  07/19/18 06:00





  Amaryl -  PO   2 mg





  DAILY@0700 [facility]   Administration





     





     





     





     


 


Insulin Aspart  1 vial  07/13/18 16:30  07/19/18 16:35





  Novolog Vial Sliding Scale -  SQ   Not Given





  [facility]   





     





     





  Protocol   





     


 


Metoprolol Tartrate  25 mg  07/13/18 22:00  07/19/18 09:10





  Lopressor -  PO   25 mg





  BID [facility]   Administration





     





     





     





     


 


Sitagliptin Phosphate  50 mg  07/17/18 07:00  07/19/18 06:01





  Januvia -  PO   50 mg





  DAILY@0700 [facility]   Administration





     





     





     





     


 


Tamsulosin HCl  0.4 mg  07/16/18 14:15  07/19/18 08:16





  Flomax -  PO   0.4 mg





  DAILY@0830 [facility]   Administration





     





     





     





     














Impression


1. JANEL


2. hyperkalemia


3. urinary retention


4. constipation


5. left leg edema


6. rash


7. DM


8. hx of hep b exposure








Plan


- renal function is improving


- maintain martin


- urology follow up


- will need outpt follow up as well


- follow biopsy reports


- will follow





Dr Fonseca

## 2018-07-19 NOTE — PN
Progress Note, Physician


History of Present Illness: 


Failed voiding trial with sensation of incomplete voiding and recurrence of 

obstruction and martin re-inserted, suspect neurogenic bladder.








- Current Medication List


Current Medications: 


Active Medications





Acetaminophen (Tylenol -)  650 mg PO Q6H PRN


   PRN Reason: FEVER


   Last Admin: 07/19/18 00:31 Dose:  650 mg


Amoxicillin/Clavulanate Potassium (Augmentin - 500mg Tablet)  1 tab PO BID@0800,

1730 [facility]


   Last Admin: 07/19/18 08:16 Dose:  1 tab


Bethanechol Chloride (Urecholine -)  50 mg PO TID [facility]


   Last Admin: 07/19/18 06:00 Dose:  50 mg


Glimepiride (Amaryl -)  2 mg PO DAILY@0700 [facility]


   Last Admin: 07/19/18 06:00 Dose:  2 mg


Insulin Aspart (Novolog Vial Sliding Scale -)  [facility]; Protocol


   Last Admin: 07/19/18 10:23 Dose:  Not Given


Metoprolol Tartrate (Lopressor -)  25 mg PO BID [facility]


   Last Admin: 07/19/18 09:10 Dose:  25 mg


Sitagliptin Phosphate (Januvia -)  50 mg PO DAILY@0700 [facility]


   Last Admin: 07/19/18 06:01 Dose:  50 mg


Tamsulosin HCl (Flomax -)  0.4 mg PO DAILY@0830 [facility]


   Last Admin: 07/19/18 08:16 Dose:  0.4 mg











- Objective


Vital Signs: 


 Vital Signs











Temperature  98.3 F   07/19/18 05:45


 


Pulse Rate  85   07/19/18 05:45


 


Respiratory Rate  20   07/19/18 05:45


 


Blood Pressure  117/70   07/19/18 05:45


 


O2 Sat by Pulse Oximetry (%)  95   07/19/18 08:28











Constitutional: Yes: No Distress, Calm


Neck: Yes: Supple


Cardiovascular: Yes: Regular Rate and Rhythm


Respiratory: Yes: Regular, CTA Bilaterally


Gastrointestinal: Yes: Normal Bowel Sounds, Soft


Genitourinary: Yes: Martin Present


Edema: No


Labs: 


 CBC, BMP





 07/19/18 09:33 





 07/19/18 09:33 





 INR, PTT











INR  1.19  (0.82-1.09)  H  07/09/18  16:58    














Problem List





- Problems


(1) Acute urinary retention


Code(s): R33.8 - OTHER RETENTION OF URINE   





(2) HTN (hypertension)


Code(s): I10 - ESSENTIAL (PRIMARY) HYPERTENSION   


Qualifiers: 


   Hypertension type: essential hypertension   Qualified Code(s): I10 - 

Essential (primary) hypertension   





(3) Pedal edema


Code(s): R60.0 - LOCALIZED EDEMA   





(4) Acute bilateral obstructive uropathy


Code(s): N13.9 - OBSTRUCTIVE AND REFLUX UROPATHY, UNSPECIFIED   





(5) Leukocytoclastic vasculitis


Code(s): M31.0 - HYPERSENSITIVITY ANGIITIS   





(6) Hemorrhagic cystitis


Code(s): N30.91 - CYSTITIS, UNSPECIFIED WITH HEMATURIA   





Assessment/Plan


Transthoracic echocardiography to assess LV/RV and valvular function





1. Sinus tachycardia and hypertension improved


2. Acute on CKD and hyperkalemia referable to obstructive uropathy with urinary 

retention and moderate bilateral hydronephrosis - suspect neurogenic bladder


3. Hemorrhagic cystitis


4. Left pedal edema, etiology unclear 


5. Left leg rash referable to leukocytoclastic vasculitis, cellulitis





PLAN:


1. Failed repeat voiding trial with subsequent elevation of creatinine


2. Continue Lopressor 25 bid


3. Complete empiric antibiotics course


4. Monitor electrolytes, bladder biopsy c/w hemorrhagic cystitis and skin 

biopsy c/w leukocytoclastic vasculitis


5. DVT prophylaxis


6. Outpt  f/u for urodynamic evaluation


7. D/c planning

## 2018-07-19 NOTE — PN
Progress Note, Physician


History of Present Illness: 





patient passed urine


then could not pass urine


catheter had to be placed


patients going home with catheter





- Current Medication List


Current Medications: 


Active Medications





Acetaminophen (Tylenol -)  650 mg PO Q6H PRN


   PRN Reason: FEVER


   Last Admin: 07/19/18 00:31 Dose:  650 mg


Amoxicillin/Clavulanate Potassium (Augmentin - 500mg Tablet)  1 tab PO BID@0800,

1730 [facility]


   Last Admin: 07/19/18 16:36 Dose:  1 tab


Bethanechol Chloride (Urecholine -)  50 mg PO TID [facility]


   Last Admin: 07/19/18 13:32 Dose:  50 mg


Glimepiride (Amaryl -)  2 mg PO DAILY@0700 [facility]


   Last Admin: 07/19/18 06:00 Dose:  2 mg


Insulin Aspart (Novolog Vial Sliding Scale -)  1 vial SQ [facility]; Protocol


   Last Admin: 07/19/18 16:35 Dose:  Not Given


Metoprolol Tartrate (Lopressor -)  25 mg PO BID [facility]


   Last Admin: 07/19/18 09:10 Dose:  25 mg


Sitagliptin Phosphate (Januvia -)  50 mg PO DAILY@0700 [facility]


   Last Admin: 07/19/18 06:01 Dose:  50 mg


Tamsulosin HCl (Flomax -)  0.4 mg PO DAILY@0830 [facility]


   Last Admin: 07/19/18 08:16 Dose:  0.4 mg











- Objective


Vital Signs: 


 Vital Signs











Temperature  99.4 F   07/19/18 15:43


 


Pulse Rate  92 H  07/19/18 15:43


 


Respiratory Rate  22   07/19/18 15:43


 


Blood Pressure  122/72   07/19/18 15:43


 


O2 Sat by Pulse Oximetry (%)  95   07/19/18 08:28











Constitutional: Yes: No Distress, Calm


Cardiovascular: Yes: Regular Rate and Rhythm


Respiratory: Yes: Regular, CTA Bilaterally


Gastrointestinal: Yes: Normal Bowel Sounds, Soft


Genitourinary: Yes: Lala Present


Musculoskeletal: Yes: WNL


Extremities: Yes: WNL


Neurological: Yes: Alert, Oriented


Psychiatric: Yes: Alert, Oriented


Labs: 


 CBC, BMP





 07/19/18 09:33 





 07/19/18 09:33 





 INR, PTT











INR  1.19  (0.82-1.09)  H  07/09/18  16:58    














Assessment/Plan





Problem List





- Problems


(1) Acute urinary retention


Code(s): R33.8 - OTHER RETENTION OF URINE   





(2) HTN (hypertension)


Code(s): I10 - ESSENTIAL (PRIMARY) HYPERTENSION   


Qualifiers: 


   Hypertension type: essential hypertension   Qualified Code(s): I10 - 

Essential (primary) hypertension   





(3) Hyperkalemia


Code(s): E87.5 - HYPERKALEMIA   





(4) Pedal edema


Code(s): R60.0 - LOCALIZED EDEMA   





(5) Acute bilateral obstructive uropathy


Code(s): N13.9 - OBSTRUCTIVE AND REFLUX UROPATHY, UNSPECIFIED   





6 cellulitis of the left leg





plan


stable off of abx


continue monitoring urine


follow with urology


rest as per the team

## 2018-07-19 NOTE — PN
Progress Note (short form)





- Note


Progress Note: 





failed repeat voiding trial with subsequent elevation of creatinine


on flomax/urecholine


martin in place with clear urine


biopsy c/w hemorrhagic cystitis


suspect neurogenic bladder dysfunction


may d/c home with martin


outpt f/u for urodynamic evaluation

## 2018-07-19 NOTE — DS
Physical Examination


Vital Signs: 


 Vital Signs











Temperature  99.4 F   07/19/18 15:43


 


Pulse Rate  92 H  07/19/18 15:43


 


Respiratory Rate  22   07/19/18 15:43


 


Blood Pressure  122/72   07/19/18 15:43


 


O2 Sat by Pulse Oximetry (%)  95   07/19/18 08:28











Constitutional: Yes: Anxious


HENT: Yes: Atraumatic


Neck: Yes: Supple


Cardiovascular: Yes: Regular Rate and Rhythm


Respiratory: Yes: CTA Bilaterally


Gastrointestinal: Yes: Normal Bowel Sounds


Extremities: Yes: WNL


Neurological: Yes: Alert, Oriented


Labs: 


 CBC, BMP





 07/19/18 09:33 





 07/19/18 09:33 











Discharge Summary


Reason For Visit: ACUTE RENAL FAILURE, ACUTE RETENTION OF URINE


Current Active Problems





Acute bilateral obstructive uropathy (Acute)


Acute renal failure (ARF) (Acute)


Acute urinary retention (Acute)


Diabetes mellitus with hyperosmolarity (Acute)


HTN (hypertension) (Acute)


Hemorrhagic cystitis (Acute)


Hyperkalemia (Acute)


Leukocytoclastic vasculitis (Acute)


Pedal edema (Acute)


Rash and nonspecific skin eruption (Acute)








Condition: Fair





- Instructions


Diet, Activity, Other Instructions: 


fu with urology next week


fu pmd 1-2 weeks





dc home with martin


Referrals: 


Rachel Solis MD [Staff Physician] - 


Cecily Michaels MD [Staff Physician] - 


Chato Chaves MD [Staff Physician] - 


Kin Madrid MD [Staff Physician] - 


Merlin Abad MD [Staff Physician] - 


Vanita Fonseca MD [Staff Physician] - 


Dorian Dutton MD [Staff Physician] - 


Disposition: HOME





- Home Medications


Comprehensive Discharge Medication List: 


Ambulatory Orders





Amox-Tr/K Cl [Augmentin 500-125mg Tablet -] 1 tab PO BID@0800,1730 #10 tablet 07 /19/18 


Bethanechol Chloride [Bethanechol Chloride -] 50 mg PO TID #100 tablet 07/19/18 


Glimepiride [Amaryl -] 2 mg PO DAILY@0700 #30 tablet 07/19/18 


Metoprolol Tartrate [Lopressor -] 25 mg PO BID #60 tablet 07/19/18 


Miscellaneous Medical Supply [Outpatient Order] 1 each  [facility] #1 misc 07/19/ 18 


Sitagliptin Phosphate [Januvia -] 50 mg PO DAILY@0700 #30 tablet 07/19/18 


Tamsulosin HCl [Flomax -] 0.4 mg PO DAILY@0830 #30 cap.er.24h 07/19/18 





dc home with martin


fu pmd/urology next week

## 2018-07-26 ENCOUNTER — HOSPITAL ENCOUNTER (EMERGENCY)
Dept: HOSPITAL 74 - JER | Age: 53
Discharge: HOME | End: 2018-07-26
Payer: COMMERCIAL

## 2018-07-26 DIAGNOSIS — I10: ICD-10-CM

## 2018-07-26 DIAGNOSIS — R33.8: ICD-10-CM

## 2018-07-26 DIAGNOSIS — T83.098A: Primary | ICD-10-CM

## 2018-07-26 DIAGNOSIS — N30.91: ICD-10-CM

## 2018-07-26 DIAGNOSIS — N17.9: ICD-10-CM

## 2018-07-26 DIAGNOSIS — Z96.0: ICD-10-CM

## 2018-07-26 DIAGNOSIS — N13.9: ICD-10-CM

## 2018-07-26 DIAGNOSIS — I77.6: ICD-10-CM

## 2018-07-26 PROCEDURE — 3C1ZX8Z IRRIGATION OF INDWELLING DEVICE USING IRRIGATING SUBSTANCE, EXTERNAL APPROACH: ICD-10-PCS

## 2018-07-26 NOTE — PDOC
Attending Attestation





- HPI


HPI: 





07/26/18 04:54


The patient is a 53 year old female with a significant PMH of hypertension, 

neurogenic bladder, hemorrhagic cystitis, leukocytoclastic vasculitis, and ARF 

( 07/09-07/19/18), acute BL obstructive uropathy, with indwelling Lala 

catheter who presents to the emergency department with  decreased urinary 

output. The patient reports decreased output today from catheter(Lala last 

changed x 1 week ago). She states the she has experienced associated increasing

, discomfort and lower abdomen distension. She denies any identifiable triggers 

or alleviators. The patient also reports intermittent dysuria. She states that 

she has a scheduled appointment with her urologist (Dr. Sandoval) tomorrow at 

1 pm tomorrow the patient denies any other symptoms or complaints.  








Documentation prepared by Alex Holland, acting as medical scribe for Josef Griggs DO.





<Alex Holland - Last Filed: 07/26/18 04:54>





- Resident


Resident Name: Cj Hahn





- ED Attending Attestation


I have performed the following: I have examined & evaluated the patient, The 

case was reviewed & discussed with the resident, I agree w/resident's findings 

& plan, Exceptions are as noted





- Physicial Exam


PE: 





07/26/18 03:19


*Physical Exam





General Appearance: Yes: Appropriately Dressed.  No: Apparent Distress, 

Intoxicated


HEENT: positive: EOMI, VILLA, Normal ENT Inspection, Normal Voice, TMs Normal, 

Pharynx Normal.  negative: Pale Conjunctivae, Photophobia, Scleral Icterus (R), 

Scleral Icterus (L)


Neck: positive: Trachea midline, Normal Thyroid, Supple.  negative: Tender, 

Rigid, Carotid bruit, Stridor, Lymphadenopathy (R), Lymphadenopathy (L), 

Thyromegaly


Respiratory/Chest: positive: Lungs Clear, Normal Breath Sounds.  negative: 

Chest Tender, Respiratory Distress, Accessory Muscle Use, Labored Respiration, 

RES, Crackles, Rales, Rhonchi, Stridor, Wheezing, Dullness


Cardiovascular: positive: Regular Rhythm, Regular Rate, S1, S2.  negative: Edema

, JVD, Murmur, Bradycardia, Tachycardia


Vascular Pulses: Dorsalis-Pedis (R): 2+, Doralis-Pedis (L): 2+


Gastrointestinal/Abdominal: positive: Normal Bowel Sounds, Flat, Soft.  negative

: Tender, Organomegaly, Pulsatile Mass, Increased Bowel Sounds, Decreased BS, 

Distended, Guarding, Rebound, Hernia, Hepatomegaly, Spleenomegaly


Lymphatic: negative: Adenopathy, Tenderness


Musculoskeletal: positive: Normal Inspection.  negative: CVA Tenderness, 

Decreased Range of Motion


Extremity: positive: Normal Capillary Refill, Normal Inspection, Normal Range 

of Motion, Pelvis Stable.  negative: Tender, Pedal Edema, Swelling, Erythema


Integumentary: positive: Normal Color, Dry, Warm.  negative: Cyanotic, Erythema

, Jaundice, Rash


Neurologic: positive: CNs II-XII NML intact, Fully Oriented, Alert, Normal Mood/

Affect, Motor Strength 5/5.  negative: EOM Palsy, Facial Droop, Sensory Deficit





- Medical Decision Making





07/26/18 20:17


Pt treated and released





<Josef Griggs - Last Filed: 07/26/18 20:17>

## 2018-07-26 NOTE — PDOC
History of Present Illness





- General


Stated Complaint: CATHETER PROBLEM


Time Seen by Provider: 07/26/18 02:50





- History of Present Illness


Initial Comments: 





07/26/18 02:57


52 yo F with h/o HTN, neurogenic bladder, hemorrhagic cystitis, 

leukocytoclastic vasculitis, and ARF ( 07/09-07/19/18), acute BL obstructive 

uropathy, with indwelling Martin catheter who p/w with decreased urinary output. 

Patient reports decreased output today from catheter. 200 drained this afternoon

, and currently 50 ml urine in martin bag. No identifiable triggers or 

alleviators. Patient reports increasing, discomfort and lower abdomen 

distension. Also endorses intermittent dysuria, with absent hematuria. Martin 

last changed x 1 week ago. Patient prompted to come to ED for martin irrigation 

by urologist Dr. velasquez. Patient with apt. at Dr. Velasquez tomorrow at 13:

00.  





Patient denies N/V, F,C, CP, SOB, diarrhea, constipation, lightheadedness, 

weakness, sensory changes. 





PMHx: as noted above. 


ROS: as noted


SHx: Denies Etoh, IVDA, tobacco. 


Allergies: NKDA


Urology Dr. Velasquez. 














Past History





- Past Medical History


Allergies/Adverse Reactions: 


 Allergies











Allergy/AdvReac Type Severity Reaction Status Date / Time


 


No Known Allergies Allergy   Verified 07/09/18 15:16











Home Medications: 


Ambulatory Orders





Amox-Tr/K Cl [Augmentin 500-125mg Tablet -] 1 tab PO BID@0800,1730 #10 tablet 07 /19/18 


Bethanechol Chloride [Bethanechol Chloride -] 50 mg PO TID #100 tablet 07/19/18 


Glimepiride [Amaryl -] 2 mg PO DAILY@0700 #30 tablet 07/19/18 


Metoprolol Tartrate [Lopressor -] 25 mg PO BID #60 tablet 07/19/18 


Miscellaneous Medical Supply [Outpatient Order] 1 each  [facility] #1 misc 07/19/ 18 


Sitagliptin Phosphate [Januvia -] 50 mg PO DAILY@0700 #30 tablet 07/19/18 


Tamsulosin HCl [Flomax -] 0.4 mg PO DAILY@0830 #30 cap.er.24h 07/19/18 








Anemia: No


Asthma: No


Cancer: No


Cardiac Disorders: Yes (MVP 10YRS AGO)


CVA: No


COPD: No


CHF: No


Dementia: No


Diabetes: No


GI Disorders: No


 Disorders: No


HTN: No


Hypercholesterolemia: No


Liver Disease: No


Seizures: No


Thyroid Disease: No





- Surgical History


Abdominal Surgery: No


Appendectomy: No


Cardiac Surgery: No


Cholecystectomy: No


Lung Surgery: No


Neurologic Surgery: No


Orthopedic Surgery: No





- Suicide/Smoking/Psychosocial Hx


Smoking History: Never smoked


Have you smoked in the past 12 months: No


Hx Alcohol Use: No


Drug/Substance Use Hx: No


Substance Use Type: None


Hx Substance Use Treatment: No





**Review of Systems





- Review of Systems


Comments:: 





07/26/18 03:14


GENERAL/CONSTITUTIONAL: No fever or chills. No weakness.


HEAD, EYES, EARS, NOSE AND THROAT: No change in vision. No ear pain or 

discharge. No sore throat.


CARDIOVASCULAR: No chest pain or shortness of breath


RESPIRATORY: No cough, wheezing, or hemoptysis.


GASTROINTESTINAL: + Abdominal pain. No nausea, vomiting, diarrhea or 

constipation.


GENITOURINARY: + change in urination, dysuria, and decreased frequency. 


MUSCULOSKELETAL: No joint or muscle swelling or pain. No neck or back pain.


SKIN: No rash


NEUROLOGIC: No headache, vertigo, loss of consciousness, or change in strength/

sensation.


ENDOCRINE: No increased thirst. No abnormal weight change


HEMATOLOGIC/LYMPHATIC: No anemia, easy bleeding, or history of blood clots.


ALLERGIC/IMMUNOLOGIC: No hives or skin allergy.








*Physical Exam





- Physical Exam


Comments: 





07/26/18 03:15


GENERAL: Awake, alert, and fully oriented, in no acute distress


HEAD: No signs of trauma, normocephalic, atraumatic 


EYES: PERRLA, EOMI, sclera anicteric, conjunctiva clear


ENT: Hearing grossly normal, nares patent, oropharynx clear without


exudates. Moist mucosa


NECK: Normal ROM, supple, no lymphadenopathy, JVD, or masses


LUNGS: No distress, speaks full sentences, clear to auscultation bilaterally 


HEART: Regular rate and rhythm, normal S1 and S2, no murmurs, rubs or gallops, 

peripheral pulses normal and equal bilaterally. 


ABDOMEN:+ Suprpapubic distension, and ttp. Abdomen soft, nontender, normoactive 

bowel sounds.  No guarding, no rebound.  No masses. Neg CVA ttp. 


: martin in place, with clear yellow tinged urine in bag. Urine inserted into 

urethra. 


EXTREMITIES : Normal inspection, Normal range of motion, no edema.  No clubbing 

or cyanosis. 


SKIN: Warm, Dry, normal turgor, no rashes or lesions noted











Medical Decision Making





- Medical Decision Making





07/26/18 03:14


52 yo F with h/o HTN, neurogenic bladder, hemorrhagic cystitis , 

leukocytoclastic vasculitis, and ARF ( 07/09-07/19/18),acute BL obstructive 

uropathy, with indwelling Martin catheter who p/w with decreased urinary output. 

VSS, AF. Patient oliguric with less than 0.5 cc/kg/hr UOP. Likely 2/2 clotting 

of tubing. Will attempt irrigation. DDx: obstructive uroapthy, cystitis, 

malignancy, pyelonephritis. 





Ed Course: 


900 cc of urine from martin following irrigation. 





Patient agrees to f/u with Dr. Velasquez in afternoon (07/26/18 1:00 PM). 


Stable for d/c with return precautions. 








*DC/Admit/Observation/Transfer


Diagnosis at time of Disposition: 


 Urinary retention with incomplete bladder emptying








- Discharge Dispostion


Disposition: HOME


Condition at time of disposition: Stable


Decision to Admit order: No





- Referrals


Referrals: 


Kin Madrid MD [Primary Care Provider] - 





- Patient Instructions


Printed Discharge Instructions:  How to Care for Your Martin Catheter -- Female


Additional Instructions: 


Please return to the emergency department with any new or worsening symptoms or 

concerns. 


Please follow up with Dr. Velasquez in afternoon (07/26/18 1:00 PM) as 

planned. 





- Post Discharge Activity





- Attestations


Physician Attestion: 





07/26/18 03:24


I attest to the information provided in this note.

## 2023-03-10 ENCOUNTER — OFFICE (OUTPATIENT)
Dept: URBAN - METROPOLITAN AREA CLINIC 121 | Facility: CLINIC | Age: 58
Setting detail: OPHTHALMOLOGY
End: 2023-03-10
Payer: COMMERCIAL

## 2023-03-10 DIAGNOSIS — H40.1131: ICD-10-CM

## 2023-03-10 PROCEDURE — 99212 OFFICE O/P EST SF 10 MIN: CPT | Performed by: OPHTHALMOLOGY

## 2023-03-10 PROCEDURE — 92083 EXTENDED VISUAL FIELD XM: CPT | Performed by: OPHTHALMOLOGY

## 2023-03-10 PROCEDURE — 92133 CPTRZD OPH DX IMG PST SGM ON: CPT | Performed by: OPHTHALMOLOGY

## 2023-03-10 ASSESSMENT — PACHYMETRY
OS_CT_UM: 581
OD_CT_CORRECTION: -3
OS_CT_CORRECTION: -3
OD_CT_UM: 588

## 2023-03-10 ASSESSMENT — TONOMETRY
OS_IOP_MMHG: 17
OD_IOP_MMHG: 17

## 2023-03-10 ASSESSMENT — CONFRONTATIONAL VISUAL FIELD TEST (CVF)
OS_FINDINGS: FULL
OD_FINDINGS: FULL

## 2023-03-29 ASSESSMENT — REFRACTION_CURRENTRX
OS_AXIS: 006
OS_CYLINDER: +1.25
OD_SPHERE: -5.75
OS_VPRISM_DIRECTION: SV
OS_SPHERE: -5.25
OD_OVR_VA: 20/
OD_VPRISM_DIRECTION: SV
OD_CYLINDER: +0.75
OD_CYLINDER: +0.50
OS_SPHERE: -5.25
OS_OVR_VA: 20/
OD_OVR_VA: 20/
OD_SPHERE: -5.75
OS_CYLINDER: +1.25
OD_VPRISM_DIRECTION: SV
OS_OVR_VA: 20/
OD_AXIS: 023
OS_AXIS: 180
OD_AXIS: 020
OS_VPRISM_DIRECTION: SV

## 2023-03-29 ASSESSMENT — REFRACTION_MANIFEST
OS_AXIS: 006
OS_CYLINDER: +1.25
OD_SPHERE: -5.75
OS_SPHERE: -5.25
OD_AXIS: 023
OD_CYLINDER: +0.50

## 2023-03-29 ASSESSMENT — AXIALLENGTH_DERIVED
OS_AL: 24.3443
OD_AL: 24.6626
OD_AL: 24.9861
OS_AL: 24.6595

## 2023-03-29 ASSESSMENT — SPHEQUIV_DERIVED
OD_SPHEQUIV: -4.75
OS_SPHEQUIV: -4.625
OD_SPHEQUIV: -5.5
OS_SPHEQUIV: -3.875

## 2023-03-29 ASSESSMENT — REFRACTION_AUTOREFRACTION
OD_CYLINDER: +1.00
OS_SPHERE: -4.50
OS_CYLINDER: +1.25
OD_AXIS: 172
OS_AXIS: 158
OD_SPHERE: -5.25

## 2023-03-29 ASSESSMENT — KERATOMETRY
METHOD_AUTO_MANUAL: AUTO
OS_K2POWER_DIOPTERS: 45.75
OD_AXISANGLE_DEGREES: 093
OD_K1POWER_DIOPTERS: 45.50
OD_K2POWER_DIOPTERS: 46.00
OS_AXISANGLE_DEGREES: 055
OS_K1POWER_DIOPTERS: 45.50

## 2023-03-29 ASSESSMENT — VISUAL ACUITY
OS_BCVA: 20/30
OD_BCVA: 20/40

## 2024-01-29 ENCOUNTER — OFFICE (OUTPATIENT)
Dept: URBAN - METROPOLITAN AREA CLINIC 121 | Facility: CLINIC | Age: 59
Setting detail: OPHTHALMOLOGY
End: 2024-01-29
Payer: COMMERCIAL

## 2024-01-29 DIAGNOSIS — H35.033: ICD-10-CM

## 2024-01-29 DIAGNOSIS — H40.033: ICD-10-CM

## 2024-01-29 DIAGNOSIS — H25.13: ICD-10-CM

## 2024-01-29 DIAGNOSIS — H40.1131: ICD-10-CM

## 2024-01-29 DIAGNOSIS — E11.9: ICD-10-CM

## 2024-01-29 PROCEDURE — 92250 FUNDUS PHOTOGRAPHY W/I&R: CPT | Performed by: OPHTHALMOLOGY

## 2024-01-29 PROCEDURE — 92014 COMPRE OPH EXAM EST PT 1/>: CPT | Performed by: OPHTHALMOLOGY

## 2024-01-29 ASSESSMENT — REFRACTION_MANIFEST
OS_CYLINDER: +1.25
OS_SPHERE: -5.25
OD_CYLINDER: +0.50
OS_SPHERE: -5.25
OS_CYLINDER: +1.25
OD_AXIS: 023
OD_AXIS: 023
OD_CYLINDER: +0.50
OS_AXIS: 006
OS_AXIS: 006
OD_SPHERE: -5.75
OD_SPHERE: -5.75

## 2024-01-29 ASSESSMENT — REFRACTION_CURRENTRX
OS_AXIS: 003
OS_VPRISM_DIRECTION: SV
OS_SPHERE: -5.25
OD_OVR_VA: 20/
OD_VPRISM_DIRECTION: SV
OS_OVR_VA: 20/
OD_CYLINDER: +0.75
OD_AXIS: 020
OD_SPHERE: -5.75
OS_CYLINDER: +1.25
OS_SPHERE: -5.25
OS_CYLINDER: +1.25
OD_SPHERE: -5.75
OD_CYLINDER: +0.75
OD_VPRISM_DIRECTION: SV
OS_AXIS: 180
OD_AXIS: 020
OD_OVR_VA: 20/
OS_OVR_VA: 20/
OS_VPRISM_DIRECTION: SV

## 2024-01-29 ASSESSMENT — REFRACTION_AUTOREFRACTION
OS_SPHERE: -4.50
OD_CYLINDER: +0.50
OD_AXIS: 146
OS_AXIS: 135
OS_CYLINDER: +1.00
OD_SPHERE: -5.00

## 2024-01-29 ASSESSMENT — SPHEQUIV_DERIVED
OD_SPHEQUIV: -4.75
OD_SPHEQUIV: -5.5
OD_SPHEQUIV: -5.5
OS_SPHEQUIV: -4
OS_SPHEQUIV: -4.625
OS_SPHEQUIV: -4.625

## 2024-01-29 ASSESSMENT — CONFRONTATIONAL VISUAL FIELD TEST (CVF)
OS_FINDINGS: FULL
OD_FINDINGS: FULL

## 2024-07-29 ENCOUNTER — OFFICE (OUTPATIENT)
Dept: URBAN - METROPOLITAN AREA CLINIC 121 | Facility: CLINIC | Age: 59
Setting detail: OPHTHALMOLOGY
End: 2024-07-29
Payer: COMMERCIAL

## 2024-07-29 DIAGNOSIS — H40.1131: ICD-10-CM

## 2024-07-29 PROCEDURE — 92133 CPTRZD OPH DX IMG PST SGM ON: CPT | Performed by: OPHTHALMOLOGY

## 2024-07-29 PROCEDURE — 99212 OFFICE O/P EST SF 10 MIN: CPT | Performed by: OPHTHALMOLOGY

## 2024-07-29 PROCEDURE — 92083 EXTENDED VISUAL FIELD XM: CPT | Performed by: OPHTHALMOLOGY

## 2024-07-29 ASSESSMENT — CONFRONTATIONAL VISUAL FIELD TEST (CVF)
OD_FINDINGS: FULL
OS_FINDINGS: FULL

## 2025-02-11 ENCOUNTER — OFFICE (OUTPATIENT)
Facility: LOCATION | Age: 60
Setting detail: OPHTHALMOLOGY
End: 2025-02-11
Payer: COMMERCIAL

## 2025-02-11 DIAGNOSIS — H40.1131: ICD-10-CM

## 2025-02-11 DIAGNOSIS — H25.13: ICD-10-CM

## 2025-02-11 DIAGNOSIS — H35.342: ICD-10-CM

## 2025-02-11 DIAGNOSIS — E11.9: ICD-10-CM

## 2025-02-11 PROCEDURE — 99213 OFFICE O/P EST LOW 20 MIN: CPT | Performed by: OPHTHALMOLOGY

## 2025-02-11 PROCEDURE — 92134 CPTRZ OPH DX IMG PST SGM RTA: CPT | Performed by: OPHTHALMOLOGY

## 2025-02-11 ASSESSMENT — REFRACTION_MANIFEST
OD_SPHERE: -5.75
OD_CYLINDER: +0.50
OS_CYLINDER: +1.25
OD_SPHERE: -5.75
OD_CYLINDER: +0.50
OS_SPHERE: -5.25
OD_AXIS: 023
OS_AXIS: 006
OD_AXIS: 023
OS_AXIS: 006
OS_SPHERE: -5.25
OS_CYLINDER: +1.25

## 2025-02-11 ASSESSMENT — KERATOMETRY
OS_AXISANGLE_DEGREES: 074
OD_K2POWER_DIOPTERS: 46.50
OD_AXISANGLE_DEGREES: 103
OS_K1POWER_DIOPTERS: 45.25
OD_K1POWER_DIOPTERS: 45.50
METHOD_AUTO_MANUAL: AUTO
OS_K2POWER_DIOPTERS: 46.25

## 2025-02-11 ASSESSMENT — CONFRONTATIONAL VISUAL FIELD TEST (CVF)
OD_FINDINGS: FULL
OS_FINDINGS: FULL

## 2025-02-11 ASSESSMENT — REFRACTION_CURRENTRX
OS_OVR_VA: 20/
OD_CYLINDER: +0.75
OD_OVR_VA: 20/
OD_SPHERE: -5.75
OS_CYLINDER: +1.25
OS_VPRISM_DIRECTION: SV
OD_CYLINDER: +0.75
OS_AXIS: 180
OD_OVR_VA: 20/
OD_CYLINDER: +0.75
OD_SPHERE: -5.75
OS_SPHERE: -5.25
OD_AXIS: 015
OS_ADD: +0.50
OD_ADD: +0.50
OD_SPHERE: -5.75
OS_SPHERE: -5.25
OS_AXIS: 177
OS_VPRISM_DIRECTION: SV
OD_VPRISM_DIRECTION: SV
OD_OVR_VA: 20/
OS_CYLINDER: +1.25
OS_OVR_VA: 20/
OS_AXIS: 003
OD_AXIS: 020
OS_SPHERE: -5.25
OS_OVR_VA: 20/
OS_CYLINDER: +1.25
OD_VPRISM_DIRECTION: SV
OD_AXIS: 020

## 2025-02-11 ASSESSMENT — VISUAL ACUITY
OS_BCVA: 20/30
OD_BCVA: 20/40

## 2025-02-11 ASSESSMENT — REFRACTION_AUTOREFRACTION
OD_CYLINDER: +0.50
OS_CYLINDER: +1.00
OS_SPHERE: -4.50
OS_AXIS: 135
OD_SPHERE: -5.00
OD_AXIS: 146

## 2025-02-11 ASSESSMENT — TONOMETRY
OD_IOP_MMHG: 18
OS_IOP_MMHG: 18

## 2025-02-11 ASSESSMENT — PACHYMETRY
OD_CT_CORRECTION: -3
OS_CT_CORRECTION: -3
OS_CT_UM: 581
OD_CT_UM: 588

## 2025-08-12 ENCOUNTER — OFFICE (OUTPATIENT)
Facility: LOCATION | Age: 60
Setting detail: OPHTHALMOLOGY
End: 2025-08-12
Payer: COMMERCIAL

## 2025-08-12 DIAGNOSIS — H25.13: ICD-10-CM

## 2025-08-12 DIAGNOSIS — E11.9: ICD-10-CM

## 2025-08-12 DIAGNOSIS — H40.1131: ICD-10-CM

## 2025-08-12 DIAGNOSIS — H35.342: ICD-10-CM

## 2025-08-12 PROCEDURE — 92250 FUNDUS PHOTOGRAPHY W/I&R: CPT | Performed by: OPHTHALMOLOGY

## 2025-08-12 PROCEDURE — 92014 COMPRE OPH EXAM EST PT 1/>: CPT | Performed by: OPHTHALMOLOGY

## 2025-08-12 ASSESSMENT — REFRACTION_MANIFEST
OD_SPHERE: -5.75
OD_CYLINDER: +0.50
OS_CYLINDER: +1.25
OD_AXIS: 023
OS_SPHERE: -5.25
OD_CYLINDER: +0.50
OD_AXIS: 023
OS_SPHERE: -5.25
OS_CYLINDER: +1.25
OD_SPHERE: -5.75
OS_AXIS: 006
OS_AXIS: 006

## 2025-08-12 ASSESSMENT — REFRACTION_AUTOREFRACTION
OD_SPHERE: -3.00
OS_CYLINDER: +1.25
OS_AXIS: 011
OS_SPHERE: -4.75
OD_CYLINDER: +1.25
OD_AXIS: 166

## 2025-08-12 ASSESSMENT — KERATOMETRY
OD_K1POWER_DIOPTERS: 45.50
METHOD_AUTO_MANUAL: AUTO
OS_K2POWER_DIOPTERS: 45.75
OD_AXISANGLE_DEGREES: 103
OD_K2POWER_DIOPTERS: 46.00
OS_K1POWER_DIOPTERS: 45.25
OS_AXISANGLE_DEGREES: 061

## 2025-08-12 ASSESSMENT — PACHYMETRY
OS_CT_CORRECTION: -3
OD_CT_UM: 588
OD_CT_CORRECTION: -3
OS_CT_UM: 581

## 2025-08-12 ASSESSMENT — REFRACTION_CURRENTRX
OD_SPHERE: -5.75
OD_VPRISM_DIRECTION: SV
OD_CYLINDER: +0.75
OD_OVR_VA: 20/
OS_CYLINDER: +1.25
OS_SPHERE: -5.25
OD_SPHERE: -5.75
OD_AXIS: 020
OS_OVR_VA: 20/
OD_ADD: +0.50
OD_CYLINDER: +0.75
OS_OVR_VA: 20/
OD_AXIS: 020
OS_VPRISM_DIRECTION: SV
OS_AXIS: 175
OS_SPHERE: -5.25
OS_ADD: +0.50
OD_AXIS: 027
OS_AXIS: 003
OD_VPRISM_DIRECTION: SV
OS_AXIS: 180
OS_OVR_VA: 20/
OD_CYLINDER: +0.75
OD_OVR_VA: 20/
OD_SPHERE: -5.75
OS_VPRISM_DIRECTION: SV
OD_OVR_VA: 20/
OS_SPHERE: -5.25
OS_CYLINDER: +1.25
OS_CYLINDER: +1.25

## 2025-08-12 ASSESSMENT — TONOMETRY
OD_IOP_MMHG: 17
OS_IOP_MMHG: 17

## 2025-08-12 ASSESSMENT — CONFRONTATIONAL VISUAL FIELD TEST (CVF)
OD_FINDINGS: FULL
OS_FINDINGS: FULL

## 2025-08-12 ASSESSMENT — VISUAL ACUITY
OD_BCVA: 20/40
OS_BCVA: 20/30